# Patient Record
Sex: FEMALE | Race: WHITE | HISPANIC OR LATINO | Employment: FULL TIME | URBAN - METROPOLITAN AREA
[De-identification: names, ages, dates, MRNs, and addresses within clinical notes are randomized per-mention and may not be internally consistent; named-entity substitution may affect disease eponyms.]

---
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
## 2022-04-10 ENCOUNTER — HOSPITAL ENCOUNTER (EMERGENCY)
Facility: HOSPITAL | Age: 22
Discharge: HOME | End: 2022-04-10
Attending: EMERGENCY MEDICINE | Admitting: EMERGENCY MEDICINE
Payer: COMMERCIAL

## 2022-04-10 VITALS
DIASTOLIC BLOOD PRESSURE: 66 MMHG | OXYGEN SATURATION: 99 % | SYSTOLIC BLOOD PRESSURE: 115 MMHG | RESPIRATION RATE: 18 BRPM | HEART RATE: 94 BPM

## 2022-04-10 DIAGNOSIS — R10.9 ABDOMINAL PAIN: Primary | ICD-10-CM

## 2022-04-10 DIAGNOSIS — K29.70 GASTRITIS: ICD-10-CM

## 2022-04-10 LAB
ALBUMIN SERPL BCP-MCNC: 4 G/DL (ref 3.5–5)
ALP SERPL-CCNC: 65 U/L (ref 46–116)
ALT SERPL W P-5'-P-CCNC: 20 U/L (ref 12–78)
ANION GAP SERPL CALCULATED.3IONS-SCNC: 5 MMOL/L (ref 4–13)
AST SERPL W P-5'-P-CCNC: 14 U/L (ref 5–45)
BASOPHILS # BLD AUTO: 0.04 THOUSANDS/ΜL (ref 0–0.1)
BASOPHILS NFR BLD AUTO: 1 % (ref 0–1)
BILIRUB SERPL-MCNC: 0.45 MG/DL (ref 0.2–1)
BILIRUB UR QL STRIP: NEGATIVE
BUN SERPL-MCNC: 13 MG/DL (ref 5–25)
CALCIUM SERPL-MCNC: 9.1 MG/DL (ref 8.3–10.1)
CHLORIDE SERPL-SCNC: 107 MMOL/L (ref 100–108)
CLARITY UR: CLEAR
CO2 SERPL-SCNC: 25 MMOL/L (ref 21–32)
COLOR UR: YELLOW
CREAT SERPL-MCNC: 0.82 MG/DL (ref 0.6–1.3)
EOSINOPHIL # BLD AUTO: 0.04 THOUSAND/ΜL (ref 0–0.61)
EOSINOPHIL NFR BLD AUTO: 1 % (ref 0–6)
ERYTHROCYTE [DISTWIDTH] IN BLOOD BY AUTOMATED COUNT: 12.6 % (ref 11.6–15.1)
EXT PREG TEST URINE: NEGATIVE
EXT. CONTROL ED NAV: NORMAL
GFR SERPL CREATININE-BSD FRML MDRD: 102 ML/MIN/1.73SQ M
GLUCOSE SERPL-MCNC: 117 MG/DL (ref 65–140)
GLUCOSE UR STRIP-MCNC: NEGATIVE MG/DL
HCT VFR BLD AUTO: 41.5 % (ref 34.8–46.1)
HGB BLD-MCNC: 13.6 G/DL (ref 11.5–15.4)
HGB UR QL STRIP.AUTO: NEGATIVE
IMM GRANULOCYTES # BLD AUTO: 0.01 THOUSAND/UL (ref 0–0.2)
IMM GRANULOCYTES NFR BLD AUTO: 0 % (ref 0–2)
KETONES UR STRIP-MCNC: ABNORMAL MG/DL
LEUKOCYTE ESTERASE UR QL STRIP: NEGATIVE
LIPASE SERPL-CCNC: 95 U/L (ref 73–393)
LYMPHOCYTES # BLD AUTO: 2.99 THOUSANDS/ΜL (ref 0.6–4.47)
LYMPHOCYTES NFR BLD AUTO: 43 % (ref 14–44)
MCH RBC QN AUTO: 29.8 PG (ref 26.8–34.3)
MCHC RBC AUTO-ENTMCNC: 32.8 G/DL (ref 31.4–37.4)
MCV RBC AUTO: 91 FL (ref 82–98)
MONOCYTES # BLD AUTO: 0.69 THOUSAND/ΜL (ref 0.17–1.22)
MONOCYTES NFR BLD AUTO: 10 % (ref 4–12)
NEUTROPHILS # BLD AUTO: 3.2 THOUSANDS/ΜL (ref 1.85–7.62)
NEUTS SEG NFR BLD AUTO: 45 % (ref 43–75)
NITRITE UR QL STRIP: NEGATIVE
NRBC BLD AUTO-RTO: 0 /100 WBCS
PH UR STRIP.AUTO: 5.5 [PH] (ref 4.5–8)
PLATELET # BLD AUTO: 271 THOUSANDS/UL (ref 149–390)
PMV BLD AUTO: 9.6 FL (ref 8.9–12.7)
POTASSIUM SERPL-SCNC: 3.2 MMOL/L (ref 3.5–5.3)
PROT SERPL-MCNC: 8 G/DL (ref 6.4–8.2)
PROT UR STRIP-MCNC: NEGATIVE MG/DL
RBC # BLD AUTO: 4.56 MILLION/UL (ref 3.81–5.12)
SODIUM SERPL-SCNC: 137 MMOL/L (ref 136–145)
SP GR UR STRIP.AUTO: >=1.03 (ref 1–1.03)
UROBILINOGEN UR QL STRIP.AUTO: 0.2 E.U./DL
WBC # BLD AUTO: 6.97 THOUSAND/UL (ref 4.31–10.16)

## 2022-04-10 PROCEDURE — 99284 EMERGENCY DEPT VISIT MOD MDM: CPT

## 2022-04-10 PROCEDURE — 85025 COMPLETE CBC W/AUTO DIFF WBC: CPT

## 2022-04-10 PROCEDURE — 80053 COMPREHEN METABOLIC PANEL: CPT

## 2022-04-10 PROCEDURE — 83690 ASSAY OF LIPASE: CPT

## 2022-04-10 PROCEDURE — 81003 URINALYSIS AUTO W/O SCOPE: CPT

## 2022-04-10 PROCEDURE — 36415 COLL VENOUS BLD VENIPUNCTURE: CPT

## 2022-04-10 PROCEDURE — 99284 EMERGENCY DEPT VISIT MOD MDM: CPT | Performed by: EMERGENCY MEDICINE

## 2022-04-10 PROCEDURE — 81025 URINE PREGNANCY TEST: CPT

## 2022-04-10 RX ORDER — FAMOTIDINE 20 MG/1
20 TABLET, FILM COATED ORAL 2 TIMES DAILY
Qty: 30 TABLET | Refills: 0 | Status: SHIPPED | OUTPATIENT
Start: 2022-04-10

## 2022-04-10 NOTE — ED PROVIDER NOTES
History  Chief Complaint   Patient presents with    Abdominal Pain     pt c/o abd pain, sharp pain on inhalation eopigastric area, no n/v/d     20-year-old female patient with no medical problems presenting with abdominal pain onset 2 hours prior to arrival   Patient states that she was resting when she suddenly had abdominal pain  States pain sharp in worsens when she inspires  Patient states nothing seems to improve or worsen her pain however pain has improved since initial onset  Denies chest pain, shortness of breath, abdominal pain, nausea, vomiting, diarrhea, urinary symptoms, vaginal bleeding/discharge but admits to chronic right-sided arm pain  Patient denies being pregnant, LMP was 2 weeks ago  None       No past medical history on file  No past surgical history on file  No family history on file  I have reviewed and agree with the history as documented  E-Cigarette/Vaping     E-Cigarette/Vaping Substances     Social History     Tobacco Use    Smoking status: Never Smoker    Smokeless tobacco: Not on file   Substance Use Topics    Alcohol use: No    Drug use: No        Review of Systems   Constitutional: Negative for chills, fatigue and fever  HENT: Negative for congestion, rhinorrhea and sore throat  Respiratory: Negative for cough, chest tightness and shortness of breath  Cardiovascular: Negative for chest pain and leg swelling  Gastrointestinal: Positive for abdominal pain  Negative for abdominal distention, diarrhea, nausea and vomiting  Genitourinary: Negative for difficulty urinating and dysuria  Musculoskeletal: Negative for arthralgias, back pain and myalgias  Right arm pain   Skin: Negative for rash and wound  Neurological: Negative for dizziness, weakness and headaches  All other systems reviewed and are negative        Physical Exam  ED Triage Vitals [04/10/22 0134]   Temp Pulse Respirations Blood Pressure SpO2   -- 94 18 115/66 99 %      Temp src Heart Rate Source Patient Position - Orthostatic VS BP Location FiO2 (%)   -- Monitor Lying Right arm --      Pain Score       8             Orthostatic Vital Signs  Vitals:    04/10/22 0134   BP: 115/66   Pulse: 94   Patient Position - Orthostatic VS: Lying       Physical Exam  Vitals reviewed  Constitutional:       Appearance: Normal appearance  HENT:      Head: Normocephalic and atraumatic  Nose: Nose normal       Mouth/Throat:      Mouth: Mucous membranes are moist       Pharynx: Oropharynx is clear  Eyes:      Extraocular Movements: Extraocular movements intact  Conjunctiva/sclera: Conjunctivae normal    Cardiovascular:      Rate and Rhythm: Normal rate and regular rhythm  Pulses: Normal pulses  Heart sounds: Normal heart sounds  Pulmonary:      Effort: Pulmonary effort is normal       Breath sounds: Normal breath sounds  Abdominal:      General: Bowel sounds are normal       Palpations: Abdomen is soft  Tenderness: There is abdominal tenderness (Epigastric tenderness) in the right upper quadrant and epigastric area  There is no right CVA tenderness or left CVA tenderness  Musculoskeletal:         General: Normal range of motion  Cervical back: Normal range of motion  Skin:     General: Skin is warm and dry  Neurological:      General: No focal deficit present  Mental Status: She is alert and oriented to person, place, and time  Mental status is at baseline           ED Medications  Medications - No data to display    Diagnostic Studies  Results Reviewed     Procedure Component Value Units Date/Time    POCT pregnancy, urine [[id number]]  (Normal) Resulted: 04/10/22 0323    Lab Status: Final result Updated: 04/10/22 0323     EXT PREG TEST UR (Ref: Negative) negative     Control valid    Urine Macroscopic, POC [[id number]]  (Abnormal) Collected: 04/10/22 0320    Lab Status: Final result Specimen: Urine Updated: 04/10/22 0321     Color, UA Yellow     Clarity, UA Clear     pH, UA 5 5     Leukocytes, UA Negative     Nitrite, UA Negative     Protein, UA Negative mg/dl      Glucose, UA Negative mg/dl      Ketones, UA 15 (1+) mg/dl      Urobilinogen, UA 0 2 E U /dl      Bilirubin, UA Negative     Blood, UA Negative     Specific Gravity, UA >=1 030    Narrative:      CLINITEK RESULT    Comprehensive metabolic panel [[id number]]  (Abnormal) Collected: 04/10/22 0152    Lab Status: Final result Specimen: Blood from Arm, Left Updated: 04/10/22 0236     Sodium 137 mmol/L      Potassium 3 2 mmol/L      Chloride 107 mmol/L      CO2 25 mmol/L      ANION GAP 5 mmol/L      BUN 13 mg/dL      Creatinine 0 82 mg/dL      Glucose 117 mg/dL      Calcium 9 1 mg/dL      AST 14 U/L      ALT 20 U/L      Alkaline Phosphatase 65 U/L      Total Protein 8 0 g/dL      Albumin 4 0 g/dL      Total Bilirubin 0 45 mg/dL      eGFR 102 ml/min/1 73sq m     Narrative:      [facility] guidelines for Chronic Kidney Disease (CKD):     Stage 1 with normal or high GFR (GFR > 90 mL/min/1 73 square meters)    Stage 2 Mild CKD (GFR = 60-89 mL/min/1 73 square meters)    Stage 3A Moderate CKD (GFR = 45-59 mL/min/1 73 square meters)    Stage 3B Moderate CKD (GFR = 30-44 mL/min/1 73 square meters)    Stage 4 Severe CKD (GFR = 15-29 mL/min/1 73 square meters)    Stage 5 End Stage CKD (GFR <15 mL/min/1 73 square meters)  Note: GFR calculation is accurate only with a steady state creatinine    Lipase [[id number]]  (Normal) Collected: 04/10/22 0152    Lab Status: Final result Specimen: Blood from Arm, Left Updated: 04/10/22 0236     Lipase 95 u/L     CBC and differential [[id number]] Collected: 04/10/22 0152    Lab Status: Final result Specimen: Blood from Arm, Left Updated: 04/10/22 0200     WBC 6 97 Thousand/uL      RBC 4 56 Million/uL      Hemoglobin 13 6 g/dL      Hematocrit 41 5 %      MCV 91 fL      MCH 29 8 pg      MCHC 32 8 g/dL      RDW 12 6 %      MPV 9 6 fL      Platelets 028 Thousands/uL nRBC 0 /100 WBCs      Neutrophils Relative 45 %      Immat GRANS % 0 %      Lymphocytes Relative 43 %      Monocytes Relative 10 %      Eosinophils Relative 1 %      Basophils Relative 1 %      Neutrophils Absolute 3 20 Thousands/µL      Immature Grans Absolute 0 01 Thousand/uL      Lymphocytes Absolute 2 99 Thousands/µL      Monocytes Absolute 0 69 Thousand/µL      Eosinophils Absolute 0 04 Thousand/µL      Basophils Absolute 0 04 Thousands/µL                  No orders to display         Procedures  Procedures      ED Course                             SBIRT 20yo+      Most Recent Value   SBIRT (22 yo +)    In order to provide better care to our patients, we are screening all of our patients for alcohol and drug use  Would it be okay to ask you these screening questions? No Filed at: 04/10/2022 0325                [facility]  Number of Diagnoses or Management Options  Abdominal pain  Gastritis  Diagnosis management comments: 24 y o  patient presenting with abd pain  No n/v/d  Exam shows epigastric tenderness  Labs WNL, normal lipase  On reexamination, patient asymptomatic and abd pain resolved  Stable for discharge with follow up with PCP with pepcid  Likely gastritis  Return precautions given  Amount and/or Complexity of Data Reviewed  Clinical lab tests: ordered and reviewed        Disposition  Final diagnoses:   Abdominal pain   Gastritis     Time reflects when diagnosis was documented in both MDM as applicable and the Disposition within this note     Time User Action Codes Description Comment    4/10/2022  3:54 AM Graeme Andrews [R10 9] Abdominal pain     4/10/2022  3:54 AM Graeme Andrews [K29 70] Gastritis       ED Disposition     ED Disposition Condition Date/Time Comment    Discharge Stable Sun Apr 10, 2022  3:54 AM Delta Mendieta discharge to home/self care              Follow-up Information    None         Discharge Medication List as of 4/10/2022  3:56 AM      START taking these medications Details   famotidine (PEPCID) 20 mg tablet Take 1 tablet (20 mg total) by mouth 2 (two) times a day, Starting Sun 4/10/2022, Normal           No discharge procedures on file  PDMP Review     None           ED Provider  Attending physically available and evaluated Delta Mendieta I managed the patient along with the ED Attending      Electronically Signed by         Ronit Magdaleno MD  04/10/22 3541

## 2022-04-10 NOTE — ED ATTENDING ATTESTATION
4/10/2022  Oziel RODRIGUES DO, saw and evaluated the patient  I have discussed the patient with the resident/non-physician practitioner and agree with the resident's/non-physician practitioner's findings, Plan of Care, and MDM as documented in the resident's/non-physician practitioner's note, except where noted  All available labs and Radiology studies were reviewed  I was present for key portions of any procedure(s) performed by the resident/non-physician practitioner and I was immediately available to provide assistance  At this point I agree with the current assessment done in the Emergency Department  I have conducted an independent evaluation of this patient a history and physical is as follows:    25-year-old female with abdominal pain  Epigastric  Hurts to move her breathe  Pain when eating at times  Sometimes at rest   No other modifying factors or associated symptoms  Last menstrual period two weeks ago  No vaginal bleeding no discharge  Has right arm pain that is chronic  Exam is tender in the epigastric area  No rebound  Negative Roman's negative McBurney's    Plan labs symptom control likely discharge    ED Course         Critical Care Time  Procedures

## 2022-07-07 ENCOUNTER — HOSPITAL ENCOUNTER (EMERGENCY)
Facility: HOSPITAL | Age: 22
Discharge: HOME | End: 2022-07-07
Attending: EMERGENCY MEDICINE
Payer: COMMERCIAL

## 2022-07-07 ENCOUNTER — APPOINTMENT (EMERGENCY)
Dept: CT IMAGING | Facility: HOSPITAL | Age: 22
End: 2022-07-07
Payer: COMMERCIAL

## 2022-07-07 ENCOUNTER — APPOINTMENT (EMERGENCY)
Dept: RADIOLOGY | Facility: HOSPITAL | Age: 22
End: 2022-07-07
Payer: COMMERCIAL

## 2022-07-07 VITALS
SYSTOLIC BLOOD PRESSURE: 106 MMHG | OXYGEN SATURATION: 100 % | DIASTOLIC BLOOD PRESSURE: 58 MMHG | RESPIRATION RATE: 16 BRPM | HEART RATE: 96 BPM | TEMPERATURE: 98.3 F

## 2022-07-07 DIAGNOSIS — T74.21XA SEXUAL ASSAULT OF ADULT, INITIAL ENCOUNTER: ICD-10-CM
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
## 2022-07-07 DIAGNOSIS — R11.0 NAUSEA: ICD-10-CM

## 2022-07-07 DIAGNOSIS — S02.2XXA NASAL FRACTURE: Primary | ICD-10-CM

## 2022-07-07 LAB
ABO GROUP BLD: NORMAL
ALBUMIN SERPL BCP-MCNC: 3.6 G/DL (ref 3.5–5)
ALP SERPL-CCNC: 58 U/L (ref 46–116)
ALT SERPL W P-5'-P-CCNC: 21 U/L (ref 12–78)
ANION GAP SERPL CALCULATED.3IONS-SCNC: 14 MMOL/L (ref 4–13)
AST SERPL W P-5'-P-CCNC: 17 U/L (ref 5–45)
B-HCG SERPL-ACNC: <2 MIU/ML
BASOPHILS # BLD AUTO: 0.04 THOUSANDS/ΜL (ref 0–0.1)
BASOPHILS NFR BLD AUTO: 0 % (ref 0–1)
BILIRUB SERPL-MCNC: 0.44 MG/DL (ref 0.2–1)
BILIRUB UR QL STRIP: NEGATIVE
BLD GP AB SCN SERPL QL: NEGATIVE
BUN SERPL-MCNC: 13 MG/DL (ref 5–25)
CALCIUM SERPL-MCNC: 9.3 MG/DL (ref 8.3–10.1)
CHLORIDE SERPL-SCNC: 104 MMOL/L (ref 100–108)
CLARITY UR: ABNORMAL
CO2 SERPL-SCNC: 20 MMOL/L (ref 21–32)
COLOR UR: YELLOW
CREAT SERPL-MCNC: 0.78 MG/DL (ref 0.6–1.3)
EOSINOPHIL # BLD AUTO: 0 THOUSAND/ΜL (ref 0–0.61)
EOSINOPHIL NFR BLD AUTO: 0 % (ref 0–6)
ERYTHROCYTE [DISTWIDTH] IN BLOOD BY AUTOMATED COUNT: 13.4 % (ref 11.6–15.1)
GFR SERPL CREATININE-BSD FRML MDRD: 109 ML/MIN/1.73SQ M
GLUCOSE SERPL-MCNC: 82 MG/DL (ref 65–140)
GLUCOSE UR STRIP-MCNC: NEGATIVE MG/DL
HCT VFR BLD AUTO: 41.2 % (ref 34.8–46.1)
HGB BLD-MCNC: 13.5 G/DL (ref 11.5–15.4)
HGB UR QL STRIP.AUTO: NEGATIVE
IMM GRANULOCYTES # BLD AUTO: 0.07 THOUSAND/UL (ref 0–0.2)
IMM GRANULOCYTES NFR BLD AUTO: 0 % (ref 0–2)
KETONES UR STRIP-MCNC: ABNORMAL MG/DL
LEUKOCYTE ESTERASE UR QL STRIP: NEGATIVE
LYMPHOCYTES # BLD AUTO: 2.26 THOUSANDS/ΜL (ref 0.6–4.47)
LYMPHOCYTES NFR BLD AUTO: 13 % (ref 14–44)
MCH RBC QN AUTO: 29.5 PG (ref 26.8–34.3)
MCHC RBC AUTO-ENTMCNC: 32.8 G/DL (ref 31.4–37.4)
MCV RBC AUTO: 90 FL (ref 82–98)
MONOCYTES # BLD AUTO: 0.9 THOUSAND/ΜL (ref 0.17–1.22)
MONOCYTES NFR BLD AUTO: 5 % (ref 4–12)
NEUTROPHILS # BLD AUTO: 13.73 THOUSANDS/ΜL (ref 1.85–7.62)
NEUTS SEG NFR BLD AUTO: 82 % (ref 43–75)
NITRITE UR QL STRIP: NEGATIVE
NRBC BLD AUTO-RTO: 0 /100 WBCS
PH UR STRIP.AUTO: 5.5 [PH] (ref 4.5–8)
PLATELET # BLD AUTO: 242 THOUSANDS/UL (ref 149–390)
PMV BLD AUTO: 9.7 FL (ref 8.9–12.7)
POTASSIUM SERPL-SCNC: 3 MMOL/L (ref 3.5–5.3)
PROT SERPL-MCNC: 7.5 G/DL (ref 6.4–8.2)
PROT UR STRIP-MCNC: NEGATIVE MG/DL
RBC # BLD AUTO: 4.58 MILLION/UL (ref 3.81–5.12)
RH BLD: POSITIVE
SODIUM SERPL-SCNC: 138 MMOL/L (ref 136–145)
SP GR UR STRIP.AUTO: 1.02 (ref 1–1.03)
SPECIMEN EXPIRATION DATE: NORMAL
UROBILINOGEN UR QL STRIP.AUTO: 0.2 E.U./DL
WBC # BLD AUTO: 17 THOUSAND/UL (ref 4.31–10.16)

## 2022-07-07 PROCEDURE — G1004 CDSM NDSC: HCPCS

## 2022-07-07 PROCEDURE — 96372 THER/PROPH/DIAG INJ SC/IM: CPT

## 2022-07-07 PROCEDURE — 86901 BLOOD TYPING SEROLOGIC RH(D): CPT | Performed by: PHYSICIAN ASSISTANT

## 2022-07-07 PROCEDURE — 70450 CT HEAD/BRAIN W/O DYE: CPT

## 2022-07-07 PROCEDURE — NC001 PR NO CHARGE: Performed by: PHYSICIAN ASSISTANT

## 2022-07-07 PROCEDURE — 86900 BLOOD TYPING SEROLOGIC ABO: CPT | Performed by: PHYSICIAN ASSISTANT

## 2022-07-07 PROCEDURE — 73564 X-RAY EXAM KNEE 4 OR MORE: CPT

## 2022-07-07 PROCEDURE — 73552 X-RAY EXAM OF FEMUR 2/>: CPT

## 2022-07-07 PROCEDURE — 84702 CHORIONIC GONADOTROPIN TEST: CPT | Performed by: PHYSICIAN ASSISTANT

## 2022-07-07 PROCEDURE — 87491 CHLMYD TRACH DNA AMP PROBE: CPT | Performed by: PHYSICIAN ASSISTANT

## 2022-07-07 PROCEDURE — 71250 CT THORAX DX C-: CPT

## 2022-07-07 PROCEDURE — 96361 HYDRATE IV INFUSION ADD-ON: CPT

## 2022-07-07 PROCEDURE — 96365 THER/PROPH/DIAG IV INF INIT: CPT

## 2022-07-07 PROCEDURE — 99285 EMERGENCY DEPT VISIT HI MDM: CPT

## 2022-07-07 PROCEDURE — 70486 CT MAXILLOFACIAL W/O DYE: CPT

## 2022-07-07 PROCEDURE — 87591 N.GONORRHOEAE DNA AMP PROB: CPT | Performed by: PHYSICIAN ASSISTANT

## 2022-07-07 PROCEDURE — 96366 THER/PROPH/DIAG IV INF ADDON: CPT

## 2022-07-07 PROCEDURE — 87389 HIV-1 AG W/HIV-1&-2 AB AG IA: CPT | Performed by: PHYSICIAN ASSISTANT

## 2022-07-07 PROCEDURE — 90715 TDAP VACCINE 7 YRS/> IM: CPT | Performed by: PHYSICIAN ASSISTANT

## 2022-07-07 PROCEDURE — 86803 HEPATITIS C AB TEST: CPT | Performed by: PHYSICIAN ASSISTANT

## 2022-07-07 PROCEDURE — 80053 COMPREHEN METABOLIC PANEL: CPT | Performed by: PHYSICIAN ASSISTANT

## 2022-07-07 PROCEDURE — 90471 IMMUNIZATION ADMIN: CPT

## 2022-07-07 PROCEDURE — 73030 X-RAY EXAM OF SHOULDER: CPT

## 2022-07-07 PROCEDURE — 86850 RBC ANTIBODY SCREEN: CPT | Performed by: PHYSICIAN ASSISTANT

## 2022-07-07 PROCEDURE — 36415 COLL VENOUS BLD VENIPUNCTURE: CPT | Performed by: PHYSICIAN ASSISTANT

## 2022-07-07 PROCEDURE — 87340 HEPATITIS B SURFACE AG IA: CPT | Performed by: PHYSICIAN ASSISTANT

## 2022-07-07 PROCEDURE — 96375 TX/PRO/DX INJ NEW DRUG ADDON: CPT

## 2022-07-07 PROCEDURE — 86706 HEP B SURFACE ANTIBODY: CPT | Performed by: PHYSICIAN ASSISTANT

## 2022-07-07 PROCEDURE — 72125 CT NECK SPINE W/O DYE: CPT

## 2022-07-07 PROCEDURE — 96376 TX/PRO/DX INJ SAME DRUG ADON: CPT

## 2022-07-07 PROCEDURE — 74176 CT ABD & PELVIS W/O CONTRAST: CPT

## 2022-07-07 PROCEDURE — 99285 EMERGENCY DEPT VISIT HI MDM: CPT | Performed by: PHYSICIAN ASSISTANT

## 2022-07-07 PROCEDURE — 86592 SYPHILIS TEST NON-TREP QUAL: CPT | Performed by: PHYSICIAN ASSISTANT

## 2022-07-07 PROCEDURE — 85025 COMPLETE CBC W/AUTO DIFF WBC: CPT | Performed by: PHYSICIAN ASSISTANT

## 2022-07-07 PROCEDURE — 81003 URINALYSIS AUTO W/O SCOPE: CPT

## 2022-07-07 RX ORDER — POTASSIUM CHLORIDE 14.9 MG/ML
20 INJECTION INTRAVENOUS ONCE
Status: COMPLETED | OUTPATIENT
Start: 2022-07-07 | End: 2022-07-07

## 2022-07-07 RX ORDER — EMTRICITABINE AND TENOFOVIR DISOPROXIL FUMARATE 200; 300 MG/1; MG/1
1 TABLET, FILM COATED ORAL DAILY
Qty: 28 TABLET | Refills: 0 | Status: SHIPPED | OUTPATIENT
Start: 2022-07-07 | End: 2022-07-07

## 2022-07-07 RX ORDER — MORPHINE SULFATE 4 MG/ML
4 INJECTION, SOLUTION INTRAMUSCULAR; INTRAVENOUS ONCE
Status: COMPLETED | OUTPATIENT
Start: 2022-07-07 | End: 2022-07-07

## 2022-07-07 RX ORDER — ONDANSETRON 4 MG/1
4 TABLET, ORALLY DISINTEGRATING ORAL ONCE
Status: COMPLETED | OUTPATIENT
Start: 2022-07-07 | End: 2022-07-07

## 2022-07-07 RX ORDER — LEVONORGESTREL 1.5 MG/1
1.5 TABLET ORAL ONCE
Status: COMPLETED | OUTPATIENT
Start: 2022-07-07 | End: 2022-07-07

## 2022-07-07 RX ORDER — SODIUM CHLORIDE 9 MG/ML
125 INJECTION, SOLUTION INTRAVENOUS CONTINUOUS
Status: DISCONTINUED | OUTPATIENT
Start: 2022-07-07 | End: 2022-07-07 | Stop reason: HOSPADM

## 2022-07-07 RX ORDER — CEFTRIAXONE 1 G/1
1 INJECTION, POWDER, FOR SOLUTION INTRAMUSCULAR; INTRAVENOUS ONCE
Status: DISCONTINUED | OUTPATIENT
Start: 2022-07-07 | End: 2022-07-07 | Stop reason: SDUPTHER

## 2022-07-07 RX ORDER — METRONIDAZOLE 500 MG/1
500 TABLET ORAL EVERY 8 HOURS SCHEDULED
Qty: 21 TABLET | Refills: 0 | Status: SHIPPED | OUTPATIENT
Start: 2022-07-07 | End: 2022-07-14

## 2022-07-07 RX ORDER — ONDANSETRON 2 MG/ML
4 INJECTION INTRAMUSCULAR; INTRAVENOUS ONCE
Status: COMPLETED | OUTPATIENT
Start: 2022-07-07 | End: 2022-07-07

## 2022-07-07 RX ORDER — DOXYCYCLINE HYCLATE 100 MG/1
100 CAPSULE ORAL ONCE
Status: COMPLETED | OUTPATIENT
Start: 2022-07-07 | End: 2022-07-07

## 2022-07-07 RX ORDER — POTASSIUM CHLORIDE 20 MEQ/1
40 TABLET, EXTENDED RELEASE ORAL ONCE
Status: COMPLETED | OUTPATIENT
Start: 2022-07-07 | End: 2022-07-07

## 2022-07-07 RX ORDER — ONDANSETRON 4 MG/1
4 TABLET, ORALLY DISINTEGRATING ORAL EVERY 6 HOURS PRN
Qty: 20 TABLET | Refills: 0 | Status: SHIPPED | OUTPATIENT
Start: 2022-07-07 | End: 2022-08-25

## 2022-07-07 RX ORDER — METRONIDAZOLE 500 MG/1
500 TABLET ORAL ONCE
Status: COMPLETED | OUTPATIENT
Start: 2022-07-07 | End: 2022-07-07

## 2022-07-07 RX ORDER — NAPROXEN 500 MG/1
500 TABLET ORAL 2 TIMES DAILY PRN
Qty: 30 TABLET | Refills: 0 | Status: SHIPPED | OUTPATIENT
Start: 2022-07-07 | End: 2022-08-25

## 2022-07-07 RX ADMIN — Medication 1 BOTTLE: at 16:47

## 2022-07-07 RX ADMIN — SODIUM CHLORIDE 1000 ML: 0.9 INJECTION, SOLUTION INTRAVENOUS at 07:33

## 2022-07-07 RX ADMIN — POTASSIUM CHLORIDE 20 MEQ: 14.9 INJECTION, SOLUTION INTRAVENOUS at 09:39

## 2022-07-07 RX ADMIN — CEFTRIAXONE SODIUM 1000 MG: 1 INJECTION, POWDER, FOR SOLUTION INTRAMUSCULAR; INTRAVENOUS at 16:49

## 2022-07-07 RX ADMIN — TETANUS TOXOID, REDUCED DIPHTHERIA TOXOID AND ACELLULAR PERTUSSIS VACCINE, ADSORBED 0.5 ML: 5; 2.5; 8; 8; 2.5 SUSPENSION INTRAMUSCULAR at 09:31

## 2022-07-07 RX ADMIN — LEVONORGESTREL 1.5 MG: 1.5 TABLET ORAL at 16:48

## 2022-07-07 RX ADMIN — METRONIDAZOLE 500 MG: 500 TABLET ORAL at 16:48

## 2022-07-07 RX ADMIN — POTASSIUM CHLORIDE 40 MEQ: 1500 TABLET, EXTENDED RELEASE ORAL at 09:39

## 2022-07-07 RX ADMIN — ONDANSETRON 4 MG: 2 INJECTION INTRAMUSCULAR; INTRAVENOUS at 07:34

## 2022-07-07 RX ADMIN — MORPHINE SULFATE 4 MG: 4 INJECTION INTRAVENOUS at 15:43

## 2022-07-07 RX ADMIN — MORPHINE SULFATE 2 MG: 2 INJECTION, SOLUTION INTRAMUSCULAR; INTRAVENOUS at 07:34

## 2022-07-07 RX ADMIN — ONDANSETRON 4 MG: 4 TABLET, ORALLY DISINTEGRATING ORAL at 17:10

## 2022-07-07 RX ADMIN — MORPHINE SULFATE 4 MG: 4 INJECTION INTRAVENOUS at 09:30

## 2022-07-07 RX ADMIN — DOXYCYCLINE 100 MG: 100 CAPSULE ORAL at 16:46

## 2022-07-07 NOTE — DISCHARGE INSTRUCTIONS
Results for orders placed or performed during the hospital encounter of 07/07/22   CBC and differential   Result Value Ref Range    WBC 17 00 (H) 4 31 - 10 16 Thousand/uL    RBC 4 58 3 81 - 5 12 Million/uL    Hemoglobin 13 5 11 5 - 15 4 g/dL    Hematocrit 41 2 34 8 - 46 1 %    MCV 90 82 - 98 fL    MCH 29 5 26 8 - 34 3 pg    MCHC 32 8 31 4 - 37 4 g/dL    RDW 13 4 11 6 - 15 1 %    MPV 9 7 8 9 - 12 7 fL    Platelets 772 785 - 111 Thousands/uL    nRBC 0 /100 WBCs    Neutrophils Relative 82 (H) 43 - 75 %    Immat GRANS % 0 0 - 2 %    Lymphocytes Relative 13 (L) 14 - 44 %    Monocytes Relative 5 4 - 12 %    Eosinophils Relative 0 0 - 6 %    Basophils Relative 0 0 - 1 %    Neutrophils Absolute 13 73 (H) 1 85 - 7 62 Thousands/µL    Immature Grans Absolute 0 07 0 00 - 0 20 Thousand/uL    Lymphocytes Absolute 2 26 0 60 - 4 47 Thousands/µL    Monocytes Absolute 0 90 0 17 - 1 22 Thousand/µL    Eosinophils Absolute 0 00 0 00 - 0 61 Thousand/µL    Basophils Absolute 0 04 0 00 - 0 10 Thousands/µL   Comprehensive metabolic panel   Result Value Ref Range    Sodium 138 136 - 145 mmol/L    Potassium 3 0 (L) 3 5 - 5 3 mmol/L    Chloride 104 100 - 108 mmol/L    CO2 20 (L) 21 - 32 mmol/L    ANION GAP 14 (H) 4 - 13 mmol/L    BUN 13 5 - 25 mg/dL    Creatinine 0 78 0 60 - 1 30 mg/dL    Glucose 82 65 - 140 mg/dL    Calcium 9 3 8 3 - 10 1 mg/dL    AST 17 5 - 45 U/L    ALT 21 12 - 78 U/L    Alkaline Phosphatase 58 46 - 116 U/L    Total Protein 7 5 6 4 - 8 2 g/dL    Albumin 3 6 3 5 - 5 0 g/dL    Total Bilirubin 0 44 0 20 - 1 00 mg/dL    eGFR 109 ml/min/1 73sq m   Quantitative hCG   Result Value Ref Range    HCG, Quant <2 <=6 mIU/mL   Type and screen   Result Value Ref Range    ABO Grouping A     Rh Factor Positive     Antibody Screen Negative     Specimen Expiration Date [id number]      TRAUMA - CT head wo contrast   Final Result      No acute intracranial hemorrhage or mass effect  Soft tissue swelling as above                    Workstation performed: [id number]         TRAUMA - CT spine cervical wo contrast   Final Result      No cervical spine fracture or traumatic malalignment  Workstation performed: [id number]         TRAUMA - CT facial bones wo contrast   Final Result      Minimally displaced fracture through the anterior nasal bone  Left periorbital soft tissue swelling  Workstation performed: [id number]         CT chest abdomen pelvis wo contrast   Final Result      No definite acute traumatic CT findings  Chronic appearing bilateral pars fractures of L5 with grade 1 anterolisthesis of L5 over S1  Fracture deformity of the anterior aspect of S1 also likely chronic  Workstation performed: [id number]         CT recon only thoracolumbar (no charge)   Final Result      More chronic appearing bilateral pars fractures of L5 with grade 1 anterolisthesis of L5 over S1 as described above  There is also deformity and bony defect of the anterior aspect of S1 also likely more chronic  Degenerative disc disease L5-S1                 Workstation performed: [id number]         XR femur 2 views RIGHT    (Results Pending)   XR knee 4+ vw right injury    (Results Pending)   XR knee 4+ vw left injury    (Results Pending)
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
## 2022-07-07 NOTE — ED NOTES
Patient again verbalized thoughts of suicide to RAMON ONEAL, ED provider notified, crisis evaluation to be completed once exam is completed       Ken Samuel RN  07/07/22 0073

## 2022-07-07 NOTE — ED NOTES
Officer nAalia Johnson from [address] [place] [address] at patient bedside        Sarah Giraldo RN  07/07/22 8670

## 2022-07-07 NOTE — ED NOTES
[employer] officers #[id number] and [address][id number] at [address] [facility] [address], [address] [place] [address]  07/07/22 0723

## 2022-07-07 NOTE — ED NOTES
Patient : Yanna Urrutia Age: 30 year old Sex: female   MRN: [id number] Encounter Date: 5/12/2022    E16/16    History     Chief Complaint   Patient presents with   • Shortness of Breath   • Cough     HPI  5/12/2022  10:34 AM Yanna Urrutia is a 30 year old female who presents to the ED via private vehicle for evaluation of SOB and a cough that began one week ago. The pt reports she has had a congested cough, nasal congestion, and pain when coughing. She notes she has a HA when she coughs frequently. Pt denies n/v/d, BLE swelling, fever, or any other associated sx. She has been seen at [facility] twice for similar sx and had a normal CXR two days ago. She was tested for COVID-19 yesterday, but has not received the results yet. She has been vaccinated against COVID-19. 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 There are no further complaints or modifying factors at this time.    Chart Review: I reviewed the patient's medications, allergies, and past medical and surgical history in [facility]. Pt was seen at [facility] on 5/10/22 for cough and upper respiratory sx, and had a normal CXR. Diagnosed with viral URI.     PCP: Mary Bach MD      Current Discharge Medication List      Prior to Admission Medications    Details   metroNIDAZOLE (Flagyl) 500 MG tablet Take 1 tablet by mouth 2 times daily.  Qty: 14 tablet, Refills: 0      traMADol (ULTRAM) 50 MG tablet Take 1 tablet by mouth every 6 hours as needed for Pain.  Qty: 8 tablet, Refills: 0      lidocaine (LIDODERM) 5 % patch Place 1 patch onto the skin every 24 hours. Remove patch 12 hours after applying  Qty: 30 patch, Refills: 0           History reviewed. No pertinent past medical history.    History reviewed. No pertinent past surgical history.    History reviewed. No pertinent family history.    Social History     Tobacco Use   • Smoking status: Never Smoker   • Smokeless tobacco: Never Used   Vaping Use   • Vaping Use:  RAMON ONEAL at bedside     Analy Tamayo RN  07/07/22 4877 never used   Substance Use Topics   • Alcohol use: Not Currently   • Drug use: Yes     Types: Marijuana     Comment: yesterday       E-cigarette/Vaping   • E-Cigarette/Vaping Use Never Used      E-Cigarette/Vaping Substances & Devices       Review of Systems   Constitutional: Negative for appetite change, chills and fever.   HENT: Positive for congestion (nasal). Negative for nosebleeds and sore throat.    Eyes: Negative for visual disturbance.   Respiratory: Positive for cough (congested) and shortness of breath.    Cardiovascular: Negative for chest pain, palpitations and leg swelling.   Gastrointestinal: Negative for abdominal pain, blood in stool, constipation, diarrhea, nausea and vomiting.   Genitourinary: Negative for difficulty urinating, dysuria, frequency, hematuria and vaginal bleeding.   Musculoskeletal: Negative for back pain.   Skin: Negative for rash.   Neurological: Positive for headaches (when coughing). Negative for dizziness and weakness.   Hematological: Does not bruise/bleed easily.   Psychiatric/Behavioral: Negative for sleep disturbance.       Physical Exam     ED Triage Vitals [05/12/22 1016]   ED Triage Vitals Group      Temp 98.6 °F (37 °C)      Heart Rate (!) 104      Resp 20      /86      SpO2 98 %      EtCO2 mmHg       Height 5' 1\" (1.549 m)      Weight 232 lb (105.2 kg)      Weight Scale Used Standing scale      BMI (Calculated) 43.84      IBW/kg (Calculated) 47.8       Physical Exam  Vitals and nursing note reviewed.   Constitutional:       General: She is not in acute distress.  HENT:      Nose: Nose normal.   Eyes:      General: No scleral icterus.     Conjunctiva/sclera: Conjunctivae normal.      Pupils: Pupils are equal, round, and reactive to light.   Cardiovascular:      Rate and Rhythm: Normal rate and regular rhythm.      Heart sounds: Normal heart sounds. No murmur heard.  Pulmonary:      Effort: Pulmonary effort is normal. No respiratory distress.      Breath sounds:  Wheezing (expiratory) present. No rales.      Comments: Congested cough.   Abdominal:      General: Bowel sounds are normal. There is no distension.      Palpations: Abdomen is soft. There is no mass.      Tenderness: There is no abdominal tenderness.   Musculoskeletal:         General: Normal range of motion.      Cervical back: Normal range of motion and neck supple.   Lymphadenopathy:      Cervical: No cervical adenopathy.   Skin:     General: Skin is warm and dry.      Findings: No rash.   Neurological:      Mental Status: She is alert.      Cranial Nerves: No cranial nerve deficit.      Coordination: Coordination normal.   Psychiatric:         Behavior: Behavior normal.         ED Course     Procedures    Lab Results     No results found for this visit on 05/12/22.    EKG Results       Radiology Results     Imaging Results    None         ED Medication Orders (From admission, onward)    Ordered Start     Status Ordering Provider    05/12/22 1046 05/12/22 1047  albuterol inhaler 2 puff  ONCE         Last MAR action: Given JAMES NAIK    05/12/22 1046 05/12/22 1047  predniSONE (DELTASONE) tablet 60 mg  ONCE         Last MAR action: Given JAMES NAIK          ED Course as of 05/12/22 1650   Thu May 12, 2022   1233 Patient feels better after the albuterol inhaler.  I reexamined her lungs she still has a mild wheeze but it has improved.  I explained to her that I believe she has a viral bronchitis.  With a normal chest x-ray 2 days ago no and no fevers I have low concern for bacterial. With prominent cough and wheezing I have low concern for PE or cardiac etiology. I will treat her with a viral bronchitis treatment course of prednisone and inhalers. [SP]      ED Course User Index  [SP] DO BREANNA Weiss  Vitals  Vitals:    05/12/22 1209 05/12/22 1210 05/12/22 1211 05/12/22 1219   BP:       BP Location:       Patient Position:       Pulse:       Resp:       Temp:       TempSrc:        SpO2: 94% 96% 98% 98%   Weight:       Height:           ED Course  Initial Impression: Yanna Urrutia is a 30 year old female who presents to the ED today with c/o SOB, congested cough, nasal congestion, and pain with coughing that began one week ago. Pt had negative CXR at [facility] recently. Plan to order Prednisone and Albuterol.     MDM  Critical Care    No Critical Care     Diagnosis  The encounter diagnosis was Bronchitis.    Follow Up:  Mary Bach MD  [address] [facility] [id number] [phone]    In 2 days  As needed, If symptoms worsen       Discharge Medication List as of 5/12/2022 12:42 PM      START taking these medications    Details   predniSONE (DELTASONE) 20 MG tablet Take 3 tablets by mouth daily.Eprescribe, Disp-15 tablet, R-0             Pt is discharged in good condition.    I have reviewed the information recorded by the scribe for accuracy and agree with its contents.          I have reviewed the information recorded by the scribe for accuracy and agree with its contents.    ____________________________________________________________________    Julieta Wei acting as a scribe for Dr. Cathy Hernandez.    Dr. Cathy Hernandez  Dictation # [id number]  Scribe: Julieta Hernandez, DO  05/12/22 3904

## 2022-07-07 NOTE — PROGRESS NOTES
Patient referredby assigned RN  Patient may need assistance with discharge medications and needs follow up care  Met patient at bedside discuss follow up abbey chu was given the option to [facility] the [facility] Clin[facility]  [facility]  Patient decided to seek care at [facility]   This worker contact Steven [phone] consult case with RN Michael who will follow up with patient , AVS /DC to be fax mehrdad mentioned agency at   Attending and assigned RN informed  SW/CM to follow
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
## 2022-07-07 NOTE — ED NOTES
Patient denies current, and history of, suicidal ideation  Patient received resources for outpatient mental health to follow up as needed      Parish Mcdonald  Crisis Intervention Specialist II  07/07/22
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
## 2022-07-08 LAB
C TRACH DNA SPEC QL NAA+PROBE: NEGATIVE
HBV SURFACE AB SER-ACNC: 12.29 MIU/ML
HBV SURFACE AG SER QL: NORMAL
HCV AB SER QL: NORMAL
HIV 1+2 AB+HIV1 P24 AG SERPL QL IA: NORMAL
N GONORRHOEA DNA SPEC QL NAA+PROBE: NEGATIVE
RPR SER QL: NORMAL

## 2022-08-17 ENCOUNTER — HOSPITAL ENCOUNTER (EMERGENCY)
Facility: HOSPITAL | Age: 22
End: 2022-08-18
Attending: EMERGENCY MEDICINE
Payer: COMMERCIAL

## 2022-08-17 DIAGNOSIS — R45.851 SUICIDAL IDEATIONS: ICD-10-CM

## 2022-08-17 DIAGNOSIS — Z00.8 MEDICAL CLEARANCE FOR PSYCHIATRIC ADMISSION: ICD-10-CM

## 2022-08-17 DIAGNOSIS — F32.A DEPRESSION: Primary | ICD-10-CM

## 2022-08-17 LAB — ETHANOL EXG-MCNC: 0 MG/DL

## 2022-08-17 PROCEDURE — 99285 EMERGENCY DEPT VISIT HI MDM: CPT

## 2022-08-17 PROCEDURE — U0005 INFEC AGEN DETEC AMPLI PROBE: HCPCS | Performed by: EMERGENCY MEDICINE

## 2022-08-17 PROCEDURE — 99285 EMERGENCY DEPT VISIT HI MDM: CPT | Performed by: EMERGENCY MEDICINE

## 2022-08-17 PROCEDURE — U0003 INFECTIOUS AGENT DETECTION BY NUCLEIC ACID (DNA OR RNA); SEVERE ACUTE RESPIRATORY SYNDROME CORONAVIRUS 2 (SARS-COV-2) (CORONAVIRUS DISEASE [COVID-19]), AMPLIFIED PROBE TECHNIQUE, MAKING USE OF HIGH THROUGHPUT TECHNOLOGIES AS DESCRIBED BY CMS-2020-01-R: HCPCS | Performed by: EMERGENCY MEDICINE

## 2022-08-17 PROCEDURE — 82075 ASSAY OF BREATH ETHANOL: CPT | Performed by: EMERGENCY MEDICINE

## 2022-08-17 PROCEDURE — 81025 URINE PREGNANCY TEST: CPT | Performed by: EMERGENCY MEDICINE

## 2022-08-17 NOTE — LETTER
[facility] EMERGENCY DEPARTMENT  [facility] [address]  [phone]  Dept: [phone]      EMTALA TRANSFER CONSENT    NAME Dionisio Baer                                        [birth year]LLI 2000                              MRN [id number]    I have been informed of my rights regarding examination, treatment, and transfer   by Dr Natalie Catherine, *    Benefits:  mental health treatment    Risks:  delay in care        { ED EMTALA TRANSFER CHOICES:[id number]}    I authorize the performance of emergency medical procedures and treatments upon me in both transit and upon arrival at the receiving facility  Additionally, I authorize the release of any and all medical records to the receiving facility and request they be transported with me, if possible  I understand that the safest mode of transportation during a medical emergency is an ambulance and that the Hospital advocates the use of this mode of transport  Risks of traveling to the receiving facility by car, including absence of medical control, life sustaining equipment, such as oxygen, and medical personnel has been explained to me and I fully understand them  (PRACHI CORRECT BOX BELOW)  [x  ]  I consent to the stated transfer and to be transported by ambulance/helicopter  [  ]  I consent to the stated transfer, but refuse transportation by ambulance and accept full responsibility for my transportation by car    I understand the risks of non-ambulance transfers and I exonerate the Hospital and its staff from any deterioration in my condition that results from this refusal     X___________________________________________    DATE  22  TIME________  Signature of patient or legally responsible individual signing on patient behalf           RELATIONSHIP TO PATIENT_____________self____________          Provider Certification    NAME Dionisio Baer                                        [birth year]LLI 2000 MRN [id number]    A medical screening exam was performed on the above named patient  Based on the examination:    Condition Necessitating Transfer The primary encounter diagnosis was Depression  Diagnoses of Suicidal ideations and Medical clearance for psychiatric admission were also pertinent to this visit  Patient Condition:  depression/ ptsd    Reason for Transfer:  inpatient treatment    Transfer Requirements: [facility]   · Space available and qualified personnel available for treatment as acknowledged by  intake   · Agreed to accept transfer and to provide appropriate medical treatment as acknowledged by        Ok Parsons PA-C  · Appropriate medical records of the examination and treatment of the patient are provided at the time of transfer   [address] [facility], [address] __ap_____  · Transfer will be performed by qualified personnel from  [address] [place] [address]   and appropriate transfer equipment as required, including the use of necessary and appropriate life support measures  Provider Certification: I have examined the patient and explained the following risks and benefits of being transferred/refusing transfer to the patient/family:         Based on these reasonable risks and benefits to the patient and/or the unborn child(jesse), and based upon the information available at the time of the patients examination, I certify that the medical benefits reasonably to be expected from the provision of appropriate medical treatments at another medical facility outweigh the increasing risks, if any, to the individuals medical condition, and in the case of labor to the unborn child, from effecting the transfer      X____________________________________________ DATE 08/18/22        TIME_______      ORIGINAL - SEND TO MEDICAL RECORDS   COPY - SEND WITH PATIENT DURING TRANSFER

## 2022-08-18 ENCOUNTER — HOSPITAL ENCOUNTER (INPATIENT)
Facility: HOSPITAL | Age: 22
LOS: 7 days | Discharge: HOME | DRG: 751 | End: 2022-08-25
Attending: STUDENT IN AN ORGANIZED HEALTH CARE EDUCATION/TRAINING PROGRAM | Admitting: STUDENT IN AN ORGANIZED HEALTH CARE EDUCATION/TRAINING PROGRAM
Payer: COMMERCIAL

## 2022-08-18 VITALS
SYSTOLIC BLOOD PRESSURE: 116 MMHG | DIASTOLIC BLOOD PRESSURE: 60 MMHG | WEIGHT: 152.56 LBS | RESPIRATION RATE: 16 BRPM | OXYGEN SATURATION: 99 % | HEART RATE: 72 BPM | TEMPERATURE: 98.3 F

## 2022-08-18 DIAGNOSIS — F33.2 SEVERE EPISODE OF RECURRENT MAJOR DEPRESSIVE DISORDER (HCC): Primary | ICD-10-CM

## 2022-08-18 DIAGNOSIS — F43.10 POST TRAUMATIC STRESS DISORDER: ICD-10-CM

## 2022-08-18 DIAGNOSIS — F17.200 NICOTINE DEPENDENCE: ICD-10-CM

## 2022-08-18 DIAGNOSIS — Z00.8 MEDICAL CLEARANCE FOR PSYCHIATRIC ADMISSION: ICD-10-CM

## 2022-08-18 LAB
AMPHETAMINES SERPL QL SCN: NEGATIVE
BARBITURATES UR QL: NEGATIVE
BENZODIAZ UR QL: NEGATIVE
COCAINE UR QL: NEGATIVE
EXT PREG TEST URINE: NEGATIVE
EXT. CONTROL ED NAV: NORMAL
METHADONE UR QL: NEGATIVE
OPIATES UR QL SCN: NEGATIVE
OXYCODONE+OXYMORPHONE UR QL SCN: NEGATIVE
PCP UR QL: NEGATIVE
SARS-COV-2 RNA RESP QL NAA+PROBE: NEGATIVE
THC UR QL: NEGATIVE

## 2022-08-18 PROCEDURE — 80307 DRUG TEST PRSMV CHEM ANLYZR: CPT | Performed by: EMERGENCY MEDICINE

## 2022-08-18 PROCEDURE — GZ59ZZZ INDIVIDUAL PSYCHOTHERAPY, PSYCHOPHYSIOLOGICAL: ICD-10-PCS | Performed by: STUDENT IN AN ORGANIZED HEALTH CARE EDUCATION/TRAINING PROGRAM

## 2022-08-18 PROCEDURE — 93005 ELECTROCARDIOGRAM TRACING: CPT

## 2022-08-18 PROCEDURE — GZHZZZZ GROUP PSYCHOTHERAPY: ICD-10-PCS | Performed by: STUDENT IN AN ORGANIZED HEALTH CARE EDUCATION/TRAINING PROGRAM

## 2022-08-18 RX ORDER — ACETAMINOPHEN 325 MG/1
650 TABLET ORAL EVERY 4 HOURS PRN
Status: CANCELLED | OUTPATIENT
Start: 2022-08-18

## 2022-08-18 RX ORDER — POLYETHYLENE GLYCOL 3350 17 G/17G
17 POWDER, FOR SOLUTION ORAL DAILY PRN
Status: DISCONTINUED | OUTPATIENT
Start: 2022-08-18 | End: 2022-08-25 | Stop reason: HOSPADM

## 2022-08-18 RX ORDER — LANOLIN ALCOHOL/MO/W.PET/CERES
3 CREAM (GRAM) TOPICAL
Status: DISCONTINUED | OUTPATIENT
Start: 2022-08-18 | End: 2022-08-25 | Stop reason: HOSPADM

## 2022-08-18 RX ORDER — LORAZEPAM 1 MG/1
1 TABLET ORAL
Status: CANCELLED | OUTPATIENT
Start: 2022-08-18

## 2022-08-18 RX ORDER — OLANZAPINE 5 MG/1
5 TABLET ORAL
Status: CANCELLED | OUTPATIENT
Start: 2022-08-18

## 2022-08-18 RX ORDER — OLANZAPINE 10 MG/1
5 INJECTION, POWDER, LYOPHILIZED, FOR SOLUTION INTRAMUSCULAR
Status: DISCONTINUED | OUTPATIENT
Start: 2022-08-18 | End: 2022-08-25 | Stop reason: HOSPADM

## 2022-08-18 RX ORDER — BISACODYL 10 MG
10 SUPPOSITORY, RECTAL RECTAL DAILY PRN
Status: CANCELLED | OUTPATIENT
Start: 2022-08-18

## 2022-08-18 RX ORDER — AMOXICILLIN 250 MG
1 CAPSULE ORAL DAILY PRN
Status: CANCELLED | OUTPATIENT
Start: 2022-08-18

## 2022-08-18 RX ORDER — ACETAMINOPHEN 325 MG/1
975 TABLET ORAL EVERY 6 HOURS PRN
Status: DISCONTINUED | OUTPATIENT
Start: 2022-08-18 | End: 2022-08-25 | Stop reason: HOSPADM

## 2022-08-18 RX ORDER — OLANZAPINE 10 MG/1
2.5 INJECTION, POWDER, LYOPHILIZED, FOR SOLUTION INTRAMUSCULAR
Status: DISCONTINUED | OUTPATIENT
Start: 2022-08-18 | End: 2022-08-25 | Stop reason: HOSPADM

## 2022-08-18 RX ORDER — OLANZAPINE 5 MG/1
2.5 TABLET ORAL
Status: CANCELLED | OUTPATIENT
Start: 2022-08-18

## 2022-08-18 RX ORDER — MINERAL OIL AND PETROLATUM 150; 830 MG/G; MG/G
1 OINTMENT OPHTHALMIC
Status: CANCELLED | OUTPATIENT
Start: 2022-08-18

## 2022-08-18 RX ORDER — BISACODYL 10 MG
10 SUPPOSITORY, RECTAL RECTAL DAILY PRN
Status: DISCONTINUED | OUTPATIENT
Start: 2022-08-18 | End: 2022-08-25 | Stop reason: HOSPADM

## 2022-08-18 RX ORDER — LORAZEPAM 2 MG/ML
1 INJECTION INTRAMUSCULAR EVERY 4 HOURS PRN
Status: DISCONTINUED | OUTPATIENT
Start: 2022-08-18 | End: 2022-08-25 | Stop reason: HOSPADM

## 2022-08-18 RX ORDER — LORAZEPAM 2 MG/ML
1 INJECTION INTRAMUSCULAR EVERY 4 HOURS PRN
Status: CANCELLED | OUTPATIENT
Start: 2022-08-18

## 2022-08-18 RX ORDER — POLYETHYLENE GLYCOL 3350 17 G/17G
17 POWDER, FOR SOLUTION ORAL DAILY PRN
Status: CANCELLED | OUTPATIENT
Start: 2022-08-18

## 2022-08-18 RX ORDER — HYDROXYZINE HYDROCHLORIDE 25 MG/1
25 TABLET, FILM COATED ORAL
Status: CANCELLED | OUTPATIENT
Start: 2022-08-18

## 2022-08-18 RX ORDER — LANOLIN ALCOHOL/MO/W.PET/CERES
3 CREAM (GRAM) TOPICAL
Status: CANCELLED | OUTPATIENT
Start: 2022-08-18

## 2022-08-18 RX ORDER — ACETAMINOPHEN 325 MG/1
975 TABLET ORAL EVERY 6 HOURS PRN
Status: CANCELLED | OUTPATIENT
Start: 2022-08-18

## 2022-08-18 RX ORDER — ACETAMINOPHEN 325 MG/1
650 TABLET ORAL EVERY 4 HOURS PRN
Status: DISCONTINUED | OUTPATIENT
Start: 2022-08-18 | End: 2022-08-25 | Stop reason: HOSPADM

## 2022-08-18 RX ORDER — ACETAMINOPHEN 325 MG/1
650 TABLET ORAL EVERY 6 HOURS PRN
Status: CANCELLED | OUTPATIENT
Start: 2022-08-18

## 2022-08-18 RX ORDER — MINERAL OIL AND PETROLATUM 150; 830 MG/G; MG/G
1 OINTMENT OPHTHALMIC
Status: DISCONTINUED | OUTPATIENT
Start: 2022-08-18 | End: 2022-08-25 | Stop reason: HOSPADM

## 2022-08-18 RX ORDER — OLANZAPINE 5 MG/1
5 TABLET ORAL
Status: DISCONTINUED | OUTPATIENT
Start: 2022-08-18 | End: 2022-08-25 | Stop reason: HOSPADM

## 2022-08-18 RX ORDER — MAGNESIUM HYDROXIDE/ALUMINUM HYDROXICE/SIMETHICONE 120; 1200; 1200 MG/30ML; MG/30ML; MG/30ML
30 SUSPENSION ORAL EVERY 4 HOURS PRN
Status: DISCONTINUED | OUTPATIENT
Start: 2022-08-18 | End: 2022-08-25 | Stop reason: HOSPADM

## 2022-08-18 RX ORDER — NICOTINE 21 MG/24HR
1 PATCH, TRANSDERMAL 24 HOURS TRANSDERMAL DAILY
Status: CANCELLED | OUTPATIENT
Start: 2022-08-18

## 2022-08-18 RX ORDER — ACETAMINOPHEN 325 MG/1
650 TABLET ORAL EVERY 6 HOURS PRN
Status: DISCONTINUED | OUTPATIENT
Start: 2022-08-18 | End: 2022-08-25 | Stop reason: HOSPADM

## 2022-08-18 RX ORDER — OLANZAPINE 2.5 MG/1
2.5 TABLET ORAL
Status: DISCONTINUED | OUTPATIENT
Start: 2022-08-18 | End: 2022-08-25 | Stop reason: HOSPADM

## 2022-08-18 RX ORDER — NICOTINE 21 MG/24HR
1 PATCH, TRANSDERMAL 24 HOURS TRANSDERMAL DAILY
Status: DISCONTINUED | OUTPATIENT
Start: 2022-08-18 | End: 2022-08-25 | Stop reason: HOSPADM

## 2022-08-18 RX ORDER — MAGNESIUM HYDROXIDE/ALUMINUM HYDROXICE/SIMETHICONE 120; 1200; 1200 MG/30ML; MG/30ML; MG/30ML
30 SUSPENSION ORAL EVERY 4 HOURS PRN
Status: CANCELLED | OUTPATIENT
Start: 2022-08-18

## 2022-08-18 RX ORDER — HYDROXYZINE 50 MG/1
50 TABLET, FILM COATED ORAL
Status: DISCONTINUED | OUTPATIENT
Start: 2022-08-18 | End: 2022-08-25 | Stop reason: HOSPADM

## 2022-08-18 RX ORDER — LORAZEPAM 2 MG/ML
2 INJECTION INTRAMUSCULAR
Status: DISCONTINUED | OUTPATIENT
Start: 2022-08-18 | End: 2022-08-25 | Stop reason: HOSPADM

## 2022-08-18 RX ORDER — OLANZAPINE 10 MG/1
2.5 INJECTION, POWDER, LYOPHILIZED, FOR SOLUTION INTRAMUSCULAR
Status: CANCELLED | OUTPATIENT
Start: 2022-08-18

## 2022-08-18 RX ORDER — LORAZEPAM 1 MG/1
1 TABLET ORAL
Status: DISCONTINUED | OUTPATIENT
Start: 2022-08-18 | End: 2022-08-25 | Stop reason: HOSPADM

## 2022-08-18 RX ORDER — AMOXICILLIN 250 MG
1 CAPSULE ORAL DAILY PRN
Status: DISCONTINUED | OUTPATIENT
Start: 2022-08-18 | End: 2022-08-25 | Stop reason: HOSPADM

## 2022-08-18 RX ORDER — LORAZEPAM 2 MG/ML
2 INJECTION INTRAMUSCULAR
Status: CANCELLED | OUTPATIENT
Start: 2022-08-18

## 2022-08-18 RX ORDER — HYDROXYZINE HYDROCHLORIDE 25 MG/1
25 TABLET, FILM COATED ORAL
Status: DISCONTINUED | OUTPATIENT
Start: 2022-08-18 | End: 2022-08-25 | Stop reason: HOSPADM

## 2022-08-18 RX ORDER — OLANZAPINE 10 MG/1
5 INJECTION, POWDER, LYOPHILIZED, FOR SOLUTION INTRAMUSCULAR
Status: CANCELLED | OUTPATIENT
Start: 2022-08-18

## 2022-08-18 RX ORDER — HYDROXYZINE 50 MG/1
50 TABLET, FILM COATED ORAL
Status: CANCELLED | OUTPATIENT
Start: 2022-08-18

## 2022-08-18 NOTE — ED NOTES
Insurance      EVS (Eligibility Verification System) called - [phone]    Automated system indicates: [facility]  ID # [id number]

## 2022-08-18 NOTE — PLAN OF CARE
Nursing team will meet with you twice a day to assess for suicidal thoughts, depression and anxiety  We will educate you on your illness, medications and help you develop alternative coping skills to deal with life stressors

## 2022-08-18 NOTE — ED NOTES
Pt sleeping, respirations wnl  Pt stable   1:1 observation continues      Arely Roper, [facility]  08/18/22 0612

## 2022-08-18 NOTE — ED NOTES
This RN assumes care of patient at this time  Pt appears to be sleeping with no signs or symptoms of respiratory distress   1:1 at bedside, this RN will continue to monitor        Wilfredo Ball RN  08/18/22 2233

## 2022-08-18 NOTE — ED NOTES
Insurance Authorization for admission:   Phone call placed to [facility]   Phone number: [phone]    Spoke to Linda Cast     3 days approved  Level of care: inpatient mental health  Review on Monday 8/22  Authorization # **     To be given on admission

## 2022-08-18 NOTE — ED NOTES
Received report on pt  Pt resting quietly  Mother at bedside   1:1 sitter present      Tarik Stiles RN  08/18/22 7581
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
## 2022-08-18 NOTE — ED NOTES
Pt sleeping, respirations wnl  Pt stable   1:1 observation continues      Fernanda Harrison, [address] [place] [address]  08/18/22 1013
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
## 2022-08-18 NOTE — PROGRESS NOTES
1 hooded sweatshirt  6 shirts  6 pants  8 underwear  6 pairs socks  1 bra  1 shampoo  1 conditioner  1 body wash  1 deodorant  1 all purpose cream  1 hairbrush  1 toothbrush and paste  1 green bag  1 pair pink slides  1 underwear  1 bra
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
## 2022-08-18 NOTE — ED NOTES
Patient is accepted at [facility] Patient is accepted by Airam Kirby PA-C    Transportation is arranged with [facility]    Transportation is scheduled for 200      Nurse report is to be called to [phone] prior to patient transfer

## 2022-08-18 NOTE — ED NOTES
Pt appears to be  Resting comfortably with no signs or symptoms of respiratory distress   at bedside for continuous observation        Little Carrel, RN  08/18/22 1037

## 2022-08-18 NOTE — ED NOTES
Pt sleeping, respirations wnl  Pt stable   1:1 observation continues      Barbie Seymour, [facility]  08/18/22 7176

## 2022-08-19 PROBLEM — Z00.8 MEDICAL CLEARANCE FOR PSYCHIATRIC ADMISSION: Status: ACTIVE | Noted: 2022-08-19

## 2022-08-19 PROBLEM — F32.A DEPRESSION: Status: ACTIVE | Noted: 2022-08-19

## 2022-08-19 PROBLEM — F43.0 ACUTE STRESS DISORDER: Status: ACTIVE | Noted: 2022-08-19

## 2022-08-19 PROBLEM — F41.9 ANXIETY: Status: ACTIVE | Noted: 2022-08-19

## 2022-08-19 PROBLEM — F43.10 POST TRAUMATIC STRESS DISORDER: Status: ACTIVE | Noted: 2022-08-19

## 2022-08-19 PROBLEM — F33.2 SEVERE EPISODE OF RECURRENT MAJOR DEPRESSIVE DISORDER (HCC): Status: ACTIVE | Noted: 2022-08-19

## 2022-08-19 LAB
ALBUMIN SERPL BCP-MCNC: 3.8 G/DL (ref 3.5–5)
ALP SERPL-CCNC: 65 U/L (ref 46–116)
ALT SERPL W P-5'-P-CCNC: 17 U/L (ref 12–78)
ANION GAP SERPL CALCULATED.3IONS-SCNC: 10 MMOL/L (ref 4–13)
AST SERPL W P-5'-P-CCNC: 13 U/L (ref 5–45)
ATRIAL RATE: 73 BPM
BASOPHILS # BLD AUTO: 0.03 THOUSANDS/ΜL (ref 0–0.1)
BASOPHILS NFR BLD AUTO: 1 % (ref 0–1)
BILIRUB SERPL-MCNC: 0.4 MG/DL (ref 0.2–1)
BUN SERPL-MCNC: 10 MG/DL (ref 5–25)
CALCIUM SERPL-MCNC: 9.2 MG/DL (ref 8.3–10.1)
CHLORIDE SERPL-SCNC: 104 MMOL/L (ref 96–108)
CHOLEST SERPL-MCNC: 125 MG/DL
CO2 SERPL-SCNC: 27 MMOL/L (ref 21–32)
CREAT SERPL-MCNC: 0.87 MG/DL (ref 0.6–1.3)
EOSINOPHIL # BLD AUTO: 0.05 THOUSAND/ΜL (ref 0–0.61)
EOSINOPHIL NFR BLD AUTO: 1 % (ref 0–6)
ERYTHROCYTE [DISTWIDTH] IN BLOOD BY AUTOMATED COUNT: 14.1 % (ref 11.6–15.1)
EST. AVERAGE GLUCOSE BLD GHB EST-MCNC: 103 MG/DL
GFR SERPL CREATININE-BSD FRML MDRD: 95 ML/MIN/1.73SQ M
GLUCOSE P FAST SERPL-MCNC: 78 MG/DL (ref 65–99)
GLUCOSE SERPL-MCNC: 78 MG/DL (ref 65–140)
HBA1C MFR BLD: 5.2 %
HCG SERPL QL: NEGATIVE
HCT VFR BLD AUTO: 43.5 % (ref 34.8–46.1)
HDLC SERPL-MCNC: 54 MG/DL
HGB BLD-MCNC: 13.7 G/DL (ref 11.5–15.4)
IMM GRANULOCYTES # BLD AUTO: 0.01 THOUSAND/UL (ref 0–0.2)
IMM GRANULOCYTES NFR BLD AUTO: 0 % (ref 0–2)
LDLC SERPL CALC-MCNC: 63 MG/DL (ref 0–100)
LYMPHOCYTES # BLD AUTO: 3.03 THOUSANDS/ΜL (ref 0.6–4.47)
LYMPHOCYTES NFR BLD AUTO: 54 % (ref 14–44)
MCH RBC QN AUTO: 28.8 PG (ref 26.8–34.3)
MCHC RBC AUTO-ENTMCNC: 31.5 G/DL (ref 31.4–37.4)
MCV RBC AUTO: 92 FL (ref 82–98)
MONOCYTES # BLD AUTO: 0.41 THOUSAND/ΜL (ref 0.17–1.22)
MONOCYTES NFR BLD AUTO: 7 % (ref 4–12)
NEUTROPHILS # BLD AUTO: 2.07 THOUSANDS/ΜL (ref 1.85–7.62)
NEUTS SEG NFR BLD AUTO: 37 % (ref 43–75)
NONHDLC SERPL-MCNC: 71 MG/DL
NRBC BLD AUTO-RTO: 0 /100 WBCS
P AXIS: 28 DEGREES
PLATELET # BLD AUTO: 326 THOUSANDS/UL (ref 149–390)
PMV BLD AUTO: 9.8 FL (ref 8.9–12.7)
POTASSIUM SERPL-SCNC: 4 MMOL/L (ref 3.5–5.3)
PR INTERVAL: 156 MS
PROT SERPL-MCNC: 7.8 G/DL (ref 6.4–8.4)
QRS AXIS: 0 DEGREES
QRSD INTERVAL: 80 MS
QT INTERVAL: 402 MS
QTC INTERVAL: 442 MS
RBC # BLD AUTO: 4.75 MILLION/UL (ref 3.81–5.12)
SODIUM SERPL-SCNC: 141 MMOL/L (ref 135–147)
T WAVE AXIS: 4 DEGREES
TRIGL SERPL-MCNC: 41 MG/DL
TSH SERPL DL<=0.05 MIU/L-ACNC: 1.36 UIU/ML (ref 0.45–4.5)
VENTRICULAR RATE: 73 BPM
WBC # BLD AUTO: 5.6 THOUSAND/UL (ref 4.31–10.16)

## 2022-08-19 PROCEDURE — 84703 CHORIONIC GONADOTROPIN ASSAY: CPT | Performed by: PHYSICIAN ASSISTANT

## 2022-08-19 PROCEDURE — 99253 IP/OBS CNSLTJ NEW/EST LOW 45: CPT | Performed by: PHYSICIAN ASSISTANT

## 2022-08-19 PROCEDURE — 99221 1ST HOSP IP/OBS SF/LOW 40: CPT | Performed by: STUDENT IN AN ORGANIZED HEALTH CARE EDUCATION/TRAINING PROGRAM

## 2022-08-19 PROCEDURE — 80061 LIPID PANEL: CPT | Performed by: PHYSICIAN ASSISTANT

## 2022-08-19 PROCEDURE — 84443 ASSAY THYROID STIM HORMONE: CPT | Performed by: PHYSICIAN ASSISTANT

## 2022-08-19 PROCEDURE — 85025 COMPLETE CBC W/AUTO DIFF WBC: CPT | Performed by: PHYSICIAN ASSISTANT

## 2022-08-19 PROCEDURE — 93010 ELECTROCARDIOGRAM REPORT: CPT | Performed by: INTERNAL MEDICINE

## 2022-08-19 PROCEDURE — 86592 SYPHILIS TEST NON-TREP QUAL: CPT | Performed by: PHYSICIAN ASSISTANT

## 2022-08-19 PROCEDURE — 80053 COMPREHEN METABOLIC PANEL: CPT | Performed by: PHYSICIAN ASSISTANT

## 2022-08-19 PROCEDURE — 83036 HEMOGLOBIN GLYCOSYLATED A1C: CPT | Performed by: PHYSICIAN ASSISTANT

## 2022-08-19 RX ORDER — MIRTAZAPINE 15 MG/1
15 TABLET, FILM COATED ORAL
Status: DISCONTINUED | OUTPATIENT
Start: 2022-08-19 | End: 2022-08-20

## 2022-08-19 RX ADMIN — MIRTAZAPINE 15 MG: 15 TABLET, FILM COATED ORAL at 21:40

## 2022-08-19 RX ADMIN — NICOTINE 1 PATCH: 14 PATCH, EXTENDED RELEASE TRANSDERMAL at 09:31
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
## 2022-08-19 NOTE — ASSESSMENT & PLAN NOTE
· Vital signs stable at time of assessment  · Lab work pending to be reviewed  · (CMP, lipid panel, CBC, TSH, hCG qualitative, A1c, RPR)  · Pregnancy test - 08/18/2022 is negative  · ETOH  08/18/2022 is negative  · Urine drug screen 08/18/2022 is negative  · COVID/flu/RSV on 08/17/2022 is negative  · EKG: NSR normal QTc HR 73  · Patient medically stable at this time for inpatient psychiatric treatment
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
## 2022-08-19 NOTE — CMS CERTIFICATION NOTE
Recertification: Based upon physical, mental and social evaluations, I certify that inpatient psychiatric services continue to be medically necessary for this patient for a duration of 7 midnights for the treatment of  Severe episode of recurrent major depressive disorder ([facility] [address] )   Available alternative community resources still do not meet the patient's mental health care needs  I further attest that an established written individualized plan of care has been updated and is outlined in the patient's medical records
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
## 2022-08-20 PROCEDURE — 99232 SBSQ HOSP IP/OBS MODERATE 35: CPT | Performed by: PSYCHIATRY & NEUROLOGY

## 2022-08-20 RX ORDER — PRAZOSIN HYDROCHLORIDE 1 MG/1
1 CAPSULE ORAL
Status: DISCONTINUED | OUTPATIENT
Start: 2022-08-20 | End: 2022-08-25 | Stop reason: HOSPADM

## 2022-08-20 RX ORDER — PRAZOSIN HYDROCHLORIDE 1 MG/1
1 CAPSULE ORAL
Status: DISCONTINUED | OUTPATIENT
Start: 2022-08-20 | End: 2022-08-20

## 2022-08-20 RX ADMIN — PRAZOSIN HYDROCHLORIDE 1 MG: 1 CAPSULE ORAL at 21:33

## 2022-08-20 RX ADMIN — SERTRALINE HYDROCHLORIDE 50 MG: 50 TABLET, FILM COATED ORAL at 21:33

## 2022-08-20 NOTE — PLAN OF CARE
Problem: SELF HARM/SUICIDALITY  Goal: Will have no self-injury during hospital stay  Description: INTERVENTIONS:  - Q 15 MINUTES: Routine safety checks  - Q WAKING SHIFT & PRN: Assess risk to determine if routine checks are adequate to maintain patient safety  - Encourage patient to participate actively in care by formulating a plan to combat response to suicidal ideation, identify supports and resources  Outcome: Progressing     Problem: DEPRESSION  Goal: Will be euthymic at discharge  Description: INTERVENTIONS:  - Administer medication as ordered  - Provide emotional support via 1:1 interaction with staff  - Encourage involvement in milieu/groups/activities  - Monitor for social isolation  Outcome: Progressing     Problem: ANXIETY  Goal: Will report anxiety at manageable levels  Description: INTERVENTIONS:  - Administer medication as ordered  - Teach and encourage coping skills  - Provide emotional support  - Assess patient/family for anxiety and ability to cope  Outcome: Progressing  Goal: By discharge: Patient will verbalize 2 strategies to deal with anxiety  Description: Interventions:  - Identify any obvious source/trigger to anxiety  - Staff will assist patient in applying identified coping technique/skills  - Encourage attendance of scheduled groups and activities  Outcome: Progressing     Problem: Nutrition/Hydration-ADULT  Goal: Nutrient/Hydration intake appropriate for improving, restoring or maintaining nutritional needs  Description: Monitor and assess patient's nutrition/hydration status for malnutrition  Collaborate with interdisciplinary team and initiate plan and interventions as ordered  Monitor patient's weight and dietary intake as ordered or per policy  Utilize nutrition screening tool and intervene as necessary  Determine patient's food preferences and provide high-protein, high-caloric foods as appropriate       INTERVENTIONS:  - Monitor oral intake, urinary output, labs, and treatment plans  - Assess nutrition and hydration status and recommend course of action  - Evaluate amount of meals eaten  - Assist patient with eating if necessary   - Allow adequate time for meals  - Recommend/ encourage appropriate diets, oral nutritional supplements, and vitamin/mineral supplements  - Order, calculate, and assess calorie counts as needed  - Recommend, monitor, and adjust tube feedings and TPN/PPN based on assessed needs  - Assess need for intravenous fluids  - Provide specific nutrition/hydration education as appropriate  - Include patient/family/caregiver in decisions related to nutrition  Outcome: Progressing     Problem: DISCHARGE PLANNING  Goal: Discharge to home or other facility with appropriate resources  Description: INTERVENTIONS:  - Identify barriers to discharge w/patient and caregiver  - Arrange for needed discharge resources and transportation as appropriate  - Identify discharge learning needs (meds, wound care, etc )  - Arrange for interpretive services to assist at discharge as needed  - Refer to Case Management Department for coordinating discharge planning if the patient needs post-hospital services based on physician/advanced practitioner order or complex needs related to functional status, cognitive ability, or social support system  Outcome: Progressing
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
## 2022-08-20 NOTE — NURSING NOTE
Pt is visible on unit, attending groups and meals  Pt reports improved sleep last night after starting Remeron  Education and handout provided  No questions at this time   Improved mood, no SI

## 2022-08-20 NOTE — NURSING NOTE
Patient visible throughout the evening, attended evening group  Pleasant, calm, and cooperative during conversation  Denies SI/HI, no hallucinations  Patient had several questions about unit therapy treatment, inquiring if there is only groups or is there individual therapy  This writer was able to explain the acute unit treatment plan and patient would be able to discuss discharge planning with CM early next week  Patient interested in having out patient services to help deal with her situation  Patient appears with a bright affect during conversation  Reports eating and sleeping well  No other questions or concerns at this time
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
## 2022-08-21 LAB — RPR SER QL: NORMAL

## 2022-08-21 PROCEDURE — 99232 SBSQ HOSP IP/OBS MODERATE 35: CPT | Performed by: PSYCHIATRY & NEUROLOGY

## 2022-08-21 RX ORDER — MIRTAZAPINE 15 MG/1
15 TABLET, FILM COATED ORAL
Status: DISCONTINUED | OUTPATIENT
Start: 2022-08-21 | End: 2022-08-25 | Stop reason: HOSPADM

## 2022-08-21 RX ADMIN — HYDROXYZINE HYDROCHLORIDE 50 MG: 50 TABLET, FILM COATED ORAL at 17:35

## 2022-08-21 RX ADMIN — SERTRALINE HYDROCHLORIDE 50 MG: 50 TABLET, FILM COATED ORAL at 21:35

## 2022-08-21 RX ADMIN — NICOTINE POLACRILEX 4 MG: 4 GUM, CHEWING BUCCAL at 18:47

## 2022-08-21 RX ADMIN — PRAZOSIN HYDROCHLORIDE 1 MG: 1 CAPSULE ORAL at 21:34

## 2022-08-21 RX ADMIN — MIRTAZAPINE 15 MG: 15 TABLET, FILM COATED ORAL at 21:35

## 2022-08-21 NOTE — PROGRESS NOTES
Progress Note - Behavioral Health   Raymodn Inman 24 y o  female MRN: [id number]  Unit/Bed#: U 258-02 Encounter: [id number]    Assessment/Plan   Principal Problem:    Post traumatic stress disorder  Active Problems:    Depression    Medical clearance for psychiatric admission    Severe episode of recurrent major depressive disorder (HCC)    Anxiety    Acute stress disorder    PLANS  Complaint of lack of sleep thru the night-Restarted Mirtazapine   Continue Sertraline 50mg , Prazosin 1mg      Behavior over the last 24 hours:  improved  Sleep: insomnia  Appetite: normal  Medication side effects: No  ROS: no complaints    Subjective:   Mag was visible on the unit  Was seen individual later individually  Reported not sleeping well last night , " tossing and turning, was not thinking about her ex- or trauma  Unsure why she could not sleep  Started Minipress and Sertraline 50mg but unsure if medication side effect   Was informed of likely reason as to stopping MIrtazpaine  Otherwise Whitney Mejía is without SI, HI  Was seen talking on the phone earlier today  Reported speaking with her mother Lockie Lover) and sisterRupert Dies)  Family is supportive, has mental health problems also, PTSD, substance hisory (mother), Bipolar ( sister)   Whitney Mejía inquired about discharge, worries,  ' have to return to work'  [de-identified] she's welcomed to return to live w/ the mother  Mental Status Evaluation:    Mental Status Evaluation:  Appearance:  Whitney Mejía is a 24 yr old [place] female/ look stated age, casually dressed and non disheveled      Behavior:  cooperative    Speech:  normal pitch, normal volume and WNL rate, prosody    Mood:  Normal,    Affect:  mood-congruent, euthymic   Thought Process:  Logical and linear, coherant, slightly tangential    Thought Content:  normal   Perceptual Disturbances: None   Risk Potential: Suicidal Ideations none  Homicidal Ideations none  Potential for Aggression No   Sensorium:  person, place, time/date and situation   Memory:  recent and remote memory grossly intact   Consciousness:  alert and awake    Attention: attention span and concentration were age appropriate   Insight:  limited   Judgment: limited   Gait/Station: seen sitting / unable to assess   Motor Activity: no abnormal movements, seen moving upper and lower extremities without distress         Progress Toward Goals: Improving slightly     Recommended Treatment: Continue with group therapy, milieu therapy and occupational therapy  Risks, benefits and possible side effects of Medications:   Risks, benefits, and possible side effects of medications explained to patient and patient verbalizes understanding  Medications:   all current active meds have been reviewed and current meds:     Vitals:    08/21/22 1551   BP: 100/77   Pulse: 77   Resp: 16   Temp: (!) 97 3 °F (36 3 °C)   SpO2: 96%       Labs: I have personally reviewed all pertinent laboratory/tests results  Most Recent Labs:   Lab Results   Component Value Date    WBC 5 60 08/19/2022    RBC 4 75 08/19/2022    HGB 13 7 08/19/2022    HCT 43 5 08/19/2022     08/19/2022    RDW 14 1 08/19/2022    NEUTROABS 2 07 08/19/2022    SODIUM 141 08/19/2022    K 4 0 08/19/2022     08/19/2022    CO2 27 08/19/2022    BUN 10 08/19/2022    CREATININE 0 87 08/19/2022    GLUC 78 08/19/2022    GLUF 78 08/19/2022    CALCIUM 9 2 08/19/2022    AST 13 08/19/2022    ALT 17 08/19/2022    ALKPHOS 65 08/19/2022    TP 7 8 08/19/2022    ALB 3 8 08/19/2022    TBILI 0 40 08/19/2022    CHOLESTEROL 125 08/19/2022    HDL 54 08/19/2022    TRIG 41 08/19/2022    LDLCALC 63 08/19/2022    NONHDLC 71 08/19/2022    GIY4OIXARFHP 1 363 08/19/2022    FREET4 1 04 04/04/2017    PREGSERUM Negative 08/19/2022    HCGQUANT <2 07/07/2022    RPR Non-Reactive 08/19/2022    HGBA1C 5 2 08/19/2022     08/19/2022       Counseling / Coordination of Care  Total floor / unit time spent today 15 minutes   Greater than 50% of total time was spent with the patient and / or family counseling and / or coordination of care   A description of the counseling / coordination of care:Treatment plan and  Unit  supports
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
## 2022-08-21 NOTE — NURSING NOTE
Pt resting in bed throughout morning/early afternoon  Pt reports poor sleep overnight  Discussed newly prescribed meds, pt acknowledges  OOB meals, no groups this morning due to feeling tired   Denies SI

## 2022-08-22 PROCEDURE — 99232 SBSQ HOSP IP/OBS MODERATE 35: CPT | Performed by: PHYSICIAN ASSISTANT

## 2022-08-22 RX ADMIN — MIRTAZAPINE 15 MG: 15 TABLET, FILM COATED ORAL at 21:33

## 2022-08-22 RX ADMIN — NICOTINE 1 PATCH: 14 PATCH, EXTENDED RELEASE TRANSDERMAL at 09:19

## 2022-08-22 RX ADMIN — PRAZOSIN HYDROCHLORIDE 1 MG: 1 CAPSULE ORAL at 21:32

## 2022-08-22 NOTE — NURSING NOTE
Awake and alert  Restless sleep  Refused breakfast   Denies SI/HI and hallucinations  Improved depression and anxiety  Social with peers and attends groups  Complaint with medications, denies side effects

## 2022-08-23 PROCEDURE — 99232 SBSQ HOSP IP/OBS MODERATE 35: CPT | Performed by: PHYSICIAN ASSISTANT

## 2022-08-23 RX ADMIN — SERTRALINE HYDROCHLORIDE 50 MG: 50 TABLET, FILM COATED ORAL at 10:03

## 2022-08-23 RX ADMIN — NICOTINE 1 PATCH: 14 PATCH, EXTENDED RELEASE TRANSDERMAL at 08:59

## 2022-08-23 RX ADMIN — PRAZOSIN HYDROCHLORIDE 1 MG: 1 CAPSULE ORAL at 21:20

## 2022-08-23 RX ADMIN — MIRTAZAPINE 15 MG: 15 TABLET, FILM COATED ORAL at 21:20

## 2022-08-23 RX ADMIN — NICOTINE POLACRILEX 4 MG: 4 GUM, CHEWING BUCCAL at 13:05

## 2022-08-23 NOTE — NURSING NOTE
Patient has been observed out in milieu, social with peers and reports overall improvement in symptoms  Pt displays improvement in affect as well, and expressed interest in attending OP therapy upon discharge to assist in continued improvement and stability with MH  Denies SI, HI and reports sleep has improved slightly, but appetite has improved significantly since coming in

## 2022-08-23 NOTE — PROGRESS NOTES
Progress Note - Demetrio Ruiz 24 y o  female MRN: [id number]   Unit/Bed#: [facility] 258-02 Encounter: [id number]    Behavior over the last 24 hours: slowly improving  Jeremy Murdock is a 24-year-old female with a history of depression and PTSD who presents for psychiatric follow-up  Staff reports no behavioral issues overnight  Patient is pleasant, calm and cooperative upon approach  Reports sleeping better last night, Zoloft now moved to the daytime and tolerating well  She is feeling less depressed, less anxious and is experiencing fewer nightmares and flashbacks  She is feeling more hopeful about the future and specifically wants to go back to school to get her GED  She did notice 1 brief moment of feeling like my heart was racing roughly 15 minutes after taking her Remeron last evening  It was not associated with any shortness of breath, chest pain, dizziness or nausea  Denies any SI or HI  No AH or VH  Sleep: slept better  Appetite: normal  Medication side effects: Yes - brief episode of palpitations   ROS: all other systems are negative    Mental Status Evaluation:    Appearance: casually dressed, appears consistent with stated age, normal grooming  Motor: no psychomotor disturbances, no gait abnormalities  Behavior: calm, cooperative, interacts with this writer appropriately  Speech: normal rate, rhythm, and volume  Mood: "little better" less depressed, less anxious  Affect: appropriate, normal range and intensity, mood-congruent, slightly brighter  Thought Process: organized, linear, and goal-oriented; intact associations  Thought Content: denies any delusional material, no preoccupation  Perception: denies any auditory or visual hallucinations, denies other perceptual disturbances  Risk Potential: denies suicidal ideation, plan, or intent   Denies homicidal ideation  Sensorium: Oriented to person, place, time, and situation  Cognition: cognitive ability appears intact but was not quantitatively tested  Consciousness: alert and awake  Attention/Concentration: able to focus without difficulty, attention and concentration are age appropriate  Insight: improved  Judgement: improved    Vital signs in last 24 hours:    Temp:  [97 7 °F (36 5 °C)-98 °F (36 7 °C)] 98 °F (36 7 °C)  HR:  [78-81] 81  Resp:  [16-17] 16  BP: (108-122)/(74-81) 108/74    Laboratory results: I have personally reviewed all pertinent laboratory/tests results    Results from the past 24 hours: No results found for this or any previous visit (from the past 24 hour(s))  Most Recent Labs:   Lab Results   Component Value Date    WBC 5 60 08/19/2022    RBC 4 75 08/19/2022    HGB 13 7 08/19/2022    HCT 43 5 08/19/2022     08/19/2022    RDW 14 1 08/19/2022    NEUTROABS 2 07 08/19/2022    SODIUM 141 08/19/2022    K 4 0 08/19/2022     08/19/2022    CO2 27 08/19/2022    BUN 10 08/19/2022    CREATININE 0 87 08/19/2022    GLUC 78 08/19/2022    CALCIUM 9 2 08/19/2022    AST 13 08/19/2022    ALT 17 08/19/2022    ALKPHOS 65 08/19/2022    TP 7 8 08/19/2022    ALB 3 8 08/19/2022    TBILI 0 40 08/19/2022    CHOLESTEROL 125 08/19/2022    HDL 54 08/19/2022    TRIG 41 08/19/2022    LDLCALC 63 08/19/2022    NONHDLC 71 08/19/2022    XIT2ZAHYWWUF 1 363 08/19/2022    FREET4 1 04 04/04/2017    PREGUR negative 08/18/2022    PREGSERUM Negative 08/19/2022    HCGQUANT <2 07/07/2022    RPR Non-Reactive 08/19/2022    HGBA1C 5 2 08/19/2022     08/19/2022       Progress Toward Goals: progressing    Assessment/Plan   Principal Problem:    Post traumatic stress disorder  Active Problems:    Depression    Medical clearance for psychiatric admission    Severe episode of recurrent major depressive disorder ([facility] [address] )    Anxiety    Acute stress disorder      Recommended Treatment:     Planned medication and treatment changes:     All current active medications have been reviewed  Encourage group therapy, milieu therapy and occupational therapy  Behavioral Health checks every 7 minutes    Continue current medications  Patient appears to be progressing, consider discharge for Thursday  Regarding brief episode of heart racing last night, suspect this is likely related to anxiety  Her EKG demonstrated normal sinus rhythm upon admission and she states she has experienced similar symptoms in the past when feeling anxious  Advised patient to notify staff if symptoms recur in the future      Current Facility-Administered Medications   Medication Dose Route Frequency Provider Last Rate    acetaminophen  650 mg Oral Q6H PRN NIKO Torres-RODRICK      acetaminophen  650 mg Oral Q4H PRN Deion Cedillo, PA-C      acetaminophen  975 mg Oral Q6H PRN Deion Cedillo, PA-C      aluminum-magnesium hydroxide-simethicone  30 mL Oral Q4H PRN NIKO Torres-RODRICK      artificial tear  1 application Both Eyes E7F PRN Deion Cedillo PA-C      bisacodyl  10 mg Rectal Daily PRN NIKO Torres-RODRICK      hydrOXYzine HCL  25 mg Oral Q6H PRN Max 4/day NIKO Torres-RODRICK      hydrOXYzine HCL  50 mg Oral Q4H PRN Max 4/day Deion Cedillo PA-C      Or    LORazepam  1 mg Intramuscular Q4H PRN Deion Cedillo PA-C      LORazepam  1 mg Oral Q4H PRN Max 6/day Deion Cedillo PA-C      Or    LORazepam  2 mg Intramuscular Q6H PRN Max 3/day Deion Cedillo PA-C      melatonin  3 mg Oral HS PRN NIKO Torres-RODRICK      mirtazapine  15 mg Oral HS Gayminesh Ceralexus, DO      nicotine  1 patch Transdermal Daily Deion Cedillo PA-C      nicotine polacrilex  4 mg Oral Q2H PRN NIKO Torres-RODRICK      OLANZapine  5 mg Oral Q4H PRN Max 3/day Deion Cedillo PA-C      Or    OLANZapine  2 5 mg Intramuscular Q4H PRN Max 3/day NIKO Torres-RODRICK      OLANZapine  5 mg Oral Q3H PRN Max 3/day NIKO Torres-RODRICK      Or    OLANZapine  5 mg Intramuscular Q3H PRN Max 3/day Deion Cedillo PA-C      OLANZapine  2 5 mg Oral Q4H PRN Max 6/day Deion Cedillo BLANK      polyethylene glycol  17 g Oral Daily PRN Phyllis Perdomo PA-C      prazosin  1 mg Oral HS Lynette Phan DO      senna-docusate sodium  1 tablet Oral Daily PRN Phyllis Perdomo PA-C      sertraline  50 mg Oral Daily Phyllis Perdomo PA-C       Risks / Benefits of Treatment:    Risks, benefits, and possible side effects of medications explained to patient and patient verbalizes understanding and agreement for treatment  Counseling / Coordination of Care:    Patient's progress discussed with staff in treatment team meeting  Medications, treatment progress and treatment plan reviewed with patient      Phyllis Perdomo PA-C 08/23/22

## 2022-08-23 NOTE — NURSING NOTE
Awake and alert  Present in milieu, social with select peers  Denies SI/HI  Reports feeling "amazing" and excited for discharge on Thursday  Compliant with medications, denies side effects  No questions or concerns

## 2022-08-23 NOTE — NURSING NOTE
Pt is walking halls with peers, pleasant  Denies Si, feels that sleep is improved  No questions or concerns  Medication adjustments have been helpful  Laughing and joking with staff and peers

## 2022-08-24 PROBLEM — Z00.8 MEDICAL CLEARANCE FOR PSYCHIATRIC ADMISSION: Status: RESOLVED | Noted: 2022-08-19 | Resolved: 2022-08-24

## 2022-08-24 PROBLEM — F32.A DEPRESSION: Status: RESOLVED | Noted: 2022-08-19 | Resolved: 2022-08-24

## 2022-08-24 PROBLEM — F43.0 ACUTE STRESS DISORDER: Status: RESOLVED | Noted: 2022-08-19 | Resolved: 2022-08-24

## 2022-08-24 PROCEDURE — 99232 SBSQ HOSP IP/OBS MODERATE 35: CPT | Performed by: PHYSICIAN ASSISTANT

## 2022-08-24 RX ADMIN — SERTRALINE HYDROCHLORIDE 50 MG: 50 TABLET, FILM COATED ORAL at 09:08

## 2022-08-24 RX ADMIN — NICOTINE 1 PATCH: 14 PATCH, EXTENDED RELEASE TRANSDERMAL at 09:08

## 2022-08-24 RX ADMIN — PRAZOSIN HYDROCHLORIDE 1 MG: 1 CAPSULE ORAL at 21:33

## 2022-08-24 RX ADMIN — MIRTAZAPINE 15 MG: 15 TABLET, FILM COATED ORAL at 21:33
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
## 2022-08-24 NOTE — DISCHARGE INSTR - APPOINTMENTS
You will be discharged to: [address], [address] [place] [address]  You confirmed that your cell phone number is: [phone]        Parker Lawrence or Philomena, [address], will be calling you after your discharge, on the phone number that you provided  They will be available as an additional support, if needed  If you wish to speak with one of them, you may contact Anneliese Lynch at [phone] or Shahnaz Maldonado at [phone]

## 2022-08-24 NOTE — NURSING NOTE
Hector Patient was observed out and about within milieu, walking and talking with her peers, having pleasant and bright affect, reporting hope for the future and a big improvement since her admission  Pt happy to report improved sleep and finally having a consistent ability to eat, which she was unable to do pta, as well as "not one suicidal thought at all "  RN congratulated pt on progress and asked if she had any concerns with tx or meds  Hector Patient discussed one mild concern, for an increased HR prior to falling asleep at night after taking Remeron  Pt reports this happened twice this week so far and that she has made both providers aware  RN asked pt about any additional sxns, she denied any pain/discomfort accompanying this  Pt was asked to inform staff next time, if it happens again

## 2022-08-24 NOTE — BH TRANSITION RECORD
Contact Information: If you have any questions, concerns, pended studies, tests and/or procedures, or emergencies regarding your inpatient behavioral health visit  Please contact [facility] behavioral health unit [phone] and ask to speak to a , nurse or physician  A contact is available 24 hours/ 7 days a week at this number  Summary of Procedures Performed During your Stay:  Below is a list of major procedures performed during your hospital stay and a summary of results:  - Cardiac Procedures/Studies: ekg  Pending Studies (From admission, onward)    None        Please follow up on the above pending studies with your PCP and/or referring provider
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
## 2022-08-24 NOTE — PROGRESS NOTES
Diagnosis of Severe episode of recurrent major depressive disorder reviewed  Short term goals for decrease in depressive symptoms, decrease in anxiety symptoms, decrease in suicidal thoughts, improvement in self care, sleep improvement, improvement in appetite discussed  All present parties in agreement and treatment plan signed        08/19/22 5740   Team Meeting   Meeting Type Tx Team Meeting   Team Members Present   Team Members Present Physician;Nurse;   Physician Team Member Dr Zachary Handley Team Member [address] Management Team Member Alecia   Patient/Family Present   Patient Present No  (Pt declined to meet with treatment team)   Patient's Family Present No
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
## 2022-08-24 NOTE — PROGRESS NOTES
Pt pleasant, social      DC: Thursday 08/24/22 0804   Team Meeting   Meeting Type Daily Rounds   Team Members Present   Team Members Present Physician;Nurse;; Other (Discipline and Name)   Physician Team Member Dr Fermín Da Silva / Olvin Setting Team Member Emily Yeung / Dionte Hess Management Team Member Hetal Fajardo / Estefany Crow   Other (Discipline and Name) Doyce Raddle - Art Therapy   Patient/Family Present   Patient Present No   Patient's Family Present No

## 2022-08-24 NOTE — PROGRESS NOTES
Pt experienced difficult night, denies SI/HI  Tearful about relationship  Reported that she does feel better  Slept  DC: Thurs/Friday (?)   08/23/22 0805   Team Meeting   Meeting Type Daily Rounds   Team Members Present   Team Members Present Physician;Nurse;; Other (Discipline and Name)   Physician Team Member Dr Kandy Mon / Jovani New Team Member Pat Lanza / Ishaan Pond Management Team Member Sudeep Daniel   Other (Discipline and Name) Arva Cola - Art Therapy   Patient/Family Present   Patient Present No   Patient's Family Present No n/a

## 2022-08-24 NOTE — PROGRESS NOTES
Pt was upset on the phone with her mother, took Atarax  Attends groups  Slept  DC: TBD    08/22/22 0805   Team Meeting   Meeting Type Daily Rounds   Team Members Present   Team Members Present Physician; Other (Discipline and Name); Nurse;   Physician Team Member Dr Zuri Martinez / Radha Roper Team Member KIARA [facility] Management Team Member Alecia   Other (Discipline and Name) Sheree Childers - Art Therapy   Patient/Family Present   Patient Present No   Patient's Family Present No

## 2022-08-24 NOTE — PROGRESS NOTES
Progress Note - Behavioral Health     Seb Joseph 24 y o  female MRN: [id number]   Unit/Bed#: Ck Fang 258-02 Encounter: [id number]    Behavior over the last 24 hours: improving  Hailey Arriaga is a 19-year-old female with a history of PTSD and depression who presents for psychiatric follow-up  Staff reports no behavioral issues overnight  She is pleasant, calm and cooperative upon approach  Affect is brighter, speech is more spontaneous and complex and she reports her mood is improved  She feels less depressed, less anxious, more motivated, and more hopeful for the future  She is excited for discharge tomorrow  Denies any SI or HI  No AH or VH  Denies any nightmares or flashbacks as well  Tolerating medications without any issues  Sleep: normal  Appetite: normal  Medication side effects: No   ROS: all other systems are negative    Mental Status Evaluation:    Appearance: casually dressed, appears consistent with stated age, normal grooming  Motor: no psychomotor disturbances, no gait abnormalities  Behavior:  Pleasant, calm, cooperative, interacts with this writer appropriately  Speech: normal rate, rhythm, and volume  Mood: "Better,"less depressed, less anxious  Affect: appropriate, normal range and intensity, mood-congruent, brighter  Thought Process: organized, linear, and goal-oriented; intact associations  Thought Content: denies any delusional material, no preoccupation  Perception: denies any auditory or visual hallucinations, denies other perceptual disturbances  Risk Potential: denies suicidal ideation, plan, or intent   Denies homicidal ideation  Sensorium: Oriented to person, place, time, and situation  Cognition: cognitive ability appears intact but was not quantitatively tested  Consciousness: alert and awake  Attention/Concentration: able to focus without difficulty, attention and concentration are age appropriate  Insight: improved  Judgement: improved    Vital signs in last 24 hours:    Temp:  [98 4 °F (36 9 °C)-98 7 °F (37 1 °C)] 98 4 °F (36 9 °C)  HR:  [] 73  Resp:  [17] 17  BP: (103-139)/(72-94) 103/72    Laboratory results: I have personally reviewed all pertinent laboratory/tests results    Results from the past 24 hours: No results found for this or any previous visit (from the past 24 hour(s))  Most Recent Labs:   Lab Results   Component Value Date    WBC 5 60 08/19/2022    RBC 4 75 08/19/2022    HGB 13 7 08/19/2022    HCT 43 5 08/19/2022     08/19/2022    RDW 14 1 08/19/2022    NEUTROABS 2 07 08/19/2022    SODIUM 141 08/19/2022    K 4 0 08/19/2022     08/19/2022    CO2 27 08/19/2022    BUN 10 08/19/2022    CREATININE 0 87 08/19/2022    GLUC 78 08/19/2022    CALCIUM 9 2 08/19/2022    AST 13 08/19/2022    ALT 17 08/19/2022    ALKPHOS 65 08/19/2022    TP 7 8 08/19/2022    ALB 3 8 08/19/2022    TBILI 0 40 08/19/2022    CHOLESTEROL 125 08/19/2022    HDL 54 08/19/2022    TRIG 41 08/19/2022    LDLCALC 63 08/19/2022    NONHDLC 71 08/19/2022    [id number] 1 363 08/19/2022    FREET4 1 04 04/04/2017    PREGUR negative 08/18/2022    PREGSERUM Negative 08/19/2022    HCGQUANT <2 07/07/2022    RPR Non-Reactive 08/19/2022    HGBA1C 5 2 08/19/2022     08/19/2022       Progress Toward Goals: progressing, working on coping skills, discharge planning    Assessment/Plan   Principal Problem:    Post traumatic stress disorder  Active Problems:    Depression    Medical clearance for psychiatric admission    Severe episode of recurrent major depressive disorder ([facility] )    Anxiety    Acute stress disorder      Recommended Treatment:     Planned medication and treatment changes: All current active medications have been reviewed  Encourage group therapy, milieu therapy and occupational therapy  Behavioral Health checks every 7 minutes    Continue current medications  On track for discharge tomorrow      Current Facility-Administered Medications   Medication Dose Route Frequency Provider Last Rate    acetaminophen  650 mg Oral Q6H PRN Heather Catching, PA-C      acetaminophen  650 mg Oral Q4H PRN Heather Catching, PA-C      acetaminophen  975 mg Oral Q6H PRN Avalon Catching, PA-C      aluminum-magnesium hydroxide-simethicone  30 mL Oral Q4H PRN Heather Catching, PA-C      artificial tear  1 application Both Eyes K3M PRN Avalon Catching, PA-C      bisacodyl  10 mg Rectal Daily PRN Avalon Catching, PA-C      hydrOXYzine HCL  25 mg Oral Q6H PRN Max 4/day Heather Catching, PA-C      hydrOXYzine HCL  50 mg Oral Q4H PRN Max 4/day Avalon Catching, PA-C      Or    LORazepam  1 mg Intramuscular Q4H PRN Avalon Catching, PA-C      LORazepam  1 mg Oral Q4H PRN Max 6/day Avalon Catching, PA-C      Or    LORazepam  2 mg Intramuscular Q6H PRN Max 3/day Heather Catching, PA-C      melatonin  3 mg Oral HS PRN Avalon Catching, PA-C      mirtazapine  15 mg Oral HS Arlester Serafin, DO      nicotine  1 patch Transdermal Daily Heather Catching, PA-C      nicotine polacrilex  4 mg Oral Q2H PRN Heather Catching, PA-C      OLANZapine  5 mg Oral Q4H PRN Max 3/day Heather Catching, PA-C      Or    OLANZapine  2 5 mg Intramuscular Q4H PRN Max 3/day Avalon Catching, PA-C      OLANZapine  5 mg Oral Q3H PRN Max 3/day Heather Catching, PA-C      Or    OLANZapine  5 mg Intramuscular Q3H PRN Max 3/day Avalon Catching, PA-C      OLANZapine  2 5 mg Oral Q4H PRN Max 6/day Heather Catching, PA-C      polyethylene glycol  17 g Oral Daily PRN Avalon Catching, PA-C      prazosin  1 mg Oral HS Arlester Serafin, DO      senna-docusate sodium  1 tablet Oral Daily PRN Avalon Catching, PA-C      sertraline  50 mg Oral Daily Avalon Catching, PA-C       Risks / Benefits of Treatment:    Risks, benefits, and possible side effects of medications explained to patient and patient verbalizes understanding and agreement for treatment  Counseling / Coordination of Care:    Patient's progress discussed with staff in treatment team meeting    Medications, treatment progress and treatment plan reviewed with patient      Vane Villela PA-C 08/24/22

## 2022-08-25 VITALS
RESPIRATION RATE: 18 BRPM | OXYGEN SATURATION: 100 % | WEIGHT: 153 LBS | TEMPERATURE: 98.4 F | HEIGHT: 68 IN | DIASTOLIC BLOOD PRESSURE: 77 MMHG | HEART RATE: 87 BPM | SYSTOLIC BLOOD PRESSURE: 118 MMHG | BODY MASS INDEX: 23.19 KG/M2

## 2022-08-25 PROCEDURE — 99239 HOSP IP/OBS DSCHRG MGMT >30: CPT | Performed by: STUDENT IN AN ORGANIZED HEALTH CARE EDUCATION/TRAINING PROGRAM

## 2022-08-25 RX ORDER — MIRTAZAPINE 15 MG/1
15 TABLET, FILM COATED ORAL
Qty: 30 TABLET | Refills: 1 | Status: SHIPPED | OUTPATIENT
Start: 2022-08-25 | End: 2022-10-24

## 2022-08-25 RX ORDER — HYDROXYZINE HYDROCHLORIDE 25 MG/1
25 TABLET, FILM COATED ORAL EVERY 6 HOURS PRN
Qty: 30 TABLET | Refills: 0 | Status: SHIPPED | OUTPATIENT
Start: 2022-08-25

## 2022-08-25 RX ORDER — PRAZOSIN HYDROCHLORIDE 1 MG/1
1 CAPSULE ORAL
Qty: 30 CAPSULE | Refills: 1 | Status: SHIPPED | OUTPATIENT
Start: 2022-08-25 | End: 2022-10-24

## 2022-08-25 RX ADMIN — SERTRALINE HYDROCHLORIDE 50 MG: 50 TABLET, FILM COATED ORAL at 09:21

## 2022-08-25 NOTE — CASE MANAGEMENT
CM called [facility] ([phone]) CM left voicemail asking for a call back to see if they can schedule pt for intake  CM called [facility] ([phone]) and left voicemail asking for a call back to schedule  CM was informed that [facility] is not taking referrals at this time  CM called [facility] ([phone]) to see if they can schedule pt for an intake for therapy and medication management  CM was transferred to intake department  CM left voicemail asking for a call back to schedule  10:45am   [facility] and she reported that they have a few appointments available but not many, only with a few therapists that have clients closing so they have availability to take on 1 client  She was able to schedule pt for PHONE intake on Wednesday Septemeber 14, 2022 at 11am with Therapist Yennifer Davey

## 2022-08-25 NOTE — PLAN OF CARE
Problem: SELF HARM/SUICIDALITY  Goal: Will have no self-injury during hospital stay  Description: INTERVENTIONS:  - Q 15 MINUTES: Routine safety checks  - Q WAKING SHIFT & PRN: Assess risk to determine if routine checks are adequate to maintain patient safety  - Encourage patient to participate actively in care by formulating a plan to combat response to suicidal ideation, identify supports and resources  Outcome: Adequate for Discharge     Problem: DEPRESSION  Goal: Will be euthymic at discharge  Description: INTERVENTIONS:  - Administer medication as ordered  - Provide emotional support via 1:1 interaction with staff  - Encourage involvement in milieu/groups/activities  - Monitor for social isolation  Outcome: Adequate for Discharge     Problem: ANXIETY  Goal: Will report anxiety at manageable levels  Description: INTERVENTIONS:  - Administer medication as ordered  - Teach and encourage coping skills  - Provide emotional support  - Assess patient/family for anxiety and ability to cope  Outcome: Adequate for Discharge  Goal: By discharge: Patient will verbalize 2 strategies to deal with anxiety  Description: Interventions:  - Identify any obvious source/trigger to anxiety  - Staff will assist patient in applying identified coping technique/skills  - Encourage attendance of scheduled groups and activities  Outcome: Adequate for Discharge     Problem: Nutrition/Hydration-ADULT  Goal: Nutrient/Hydration intake appropriate for improving, restoring or maintaining nutritional needs  Description: Monitor and assess patient's nutrition/hydration status for malnutrition  Collaborate with interdisciplinary team and initiate plan and interventions as ordered  Monitor patient's weight and dietary intake as ordered or per policy  Utilize nutrition screening tool and intervene as necessary  Determine patient's food preferences and provide high-protein, high-caloric foods as appropriate       INTERVENTIONS:  - Monitor oral intake, urinary output, labs, and treatment plans  - Assess nutrition and hydration status and recommend course of action  - Evaluate amount of meals eaten  - Assist patient with eating if necessary   - Allow adequate time for meals  - Recommend/ encourage appropriate diets, oral nutritional supplements, and vitamin/mineral supplements  - Order, calculate, and assess calorie counts as needed  - Recommend, monitor, and adjust tube feedings and TPN/PPN based on assessed needs  - Assess need for intravenous fluids  - Provide specific nutrition/hydration education as appropriate  - Include patient/family/caregiver in decisions related to nutrition  Outcome: Adequate for Discharge     Problem: DISCHARGE PLANNING  Goal: Discharge to home or other facility with appropriate resources  Description: INTERVENTIONS:  - Identify barriers to discharge w/patient and caregiver  - Arrange for needed discharge resources and transportation as appropriate  - Identify discharge learning needs (meds, wound care, etc )  - Arrange for interpretive services to assist at discharge as needed  - Refer to Case Management Department for coordinating discharge planning if the patient needs post-hospital services based on physician/advanced practitioner order or complex needs related to functional status, cognitive ability, or social support system  Outcome: Adequate for Discharge     Expressed readiness for discharge
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
## 2022-08-25 NOTE — NURSING NOTE
Awake and alert  Present in milieu, social with peers and attending groups  Expressed readiness for discharge  Denies SI/HI and hallucinations  Improved mood  Improved sleep and appetite  States mom is a good support for her  CM provided patient with information on [facility]  Encouraged compliance with medications and OP appointments  Mom will provided discharge transportation  No questions or concerns
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
## 2024-08-13 ENCOUNTER — HOSPITAL ENCOUNTER (EMERGENCY)
Facility: HOSPITAL | Age: 24
Discharge: HOME | End: 2024-08-13
Attending: EMERGENCY MEDICINE
Payer: COMMERCIAL

## 2024-08-13 VITALS
WEIGHT: 141.54 LBS | BODY MASS INDEX: 21.52 KG/M2 | DIASTOLIC BLOOD PRESSURE: 82 MMHG | HEART RATE: 80 BPM | RESPIRATION RATE: 16 BRPM | OXYGEN SATURATION: 100 % | SYSTOLIC BLOOD PRESSURE: 128 MMHG | TEMPERATURE: 97.9 F

## 2024-08-13 DIAGNOSIS — Z59.00 HOMELESSNESS: ICD-10-CM

## 2024-08-13 DIAGNOSIS — F19.10 SUBSTANCE ABUSE (HCC): ICD-10-CM

## 2024-08-13 DIAGNOSIS — F11.10 OPIOID ABUSE (HCC): Primary | ICD-10-CM

## 2024-08-13 PROCEDURE — 99284 EMERGENCY DEPT VISIT MOD MDM: CPT | Performed by: EMERGENCY MEDICINE

## 2024-08-13 PROCEDURE — 99284 EMERGENCY DEPT VISIT MOD MDM: CPT

## 2024-08-13 RX ADMIN — NALOXONE HYDROCHLORIDE 4 MG: 4 SPRAY NASAL at 06:28

## 2024-08-13 SDOH — ECONOMIC STABILITY - HOUSING INSECURITY: HOMELESSNESS UNSPECIFIED: Z59.00

## 2024-08-13 NOTE — ED CARE HANDOFF
[facility] Warm Handoff Outcome Note    Patient name Mag Hinojosa  Location FT 01/FT 01 MRN [id number]  Age: 23 y.o.          Plan Type:  Warm Handoff                                                                                    Plan Date: 8/13/2024  Service:  ED Warm Handoff      Substance Use History:  Opiates    Warm Handoff Update:  Pt d/c home    Warm Handoff Outcome: Treatment Related Resources

## 2024-08-13 NOTE — ED NOTES
Assumed care for pt. Pt currently sleeping with no s/s of distress observed.     Giana Anne RN  08/13/24 5404

## 2024-08-13 NOTE — CERTIFIED RECOVERY SPECIALIST
"   Certified  Note    Patient name: Mag Hinojosa  Location:   [place]  Attending:  Odell Costello MD MRN [id number]  : 2000  Age: 23 y.o.    Sex: female Date 2024         Substance Use History:     Social History     Substance and Sexual Activity   Alcohol Use Not Currently    Alcohol/week: 1.0 - 5.0 standard drink of alcohol    Types: 1 - 5 Glasses of wine per week    Comment: pt denies in last month, but has had > 5 drinks in one sitting.        Social History     Substance and Sexual Activity   Drug Use Yes    Types: Heroin, Fentanyl    Comment: reports \"weaning herself off.\"     Time spent 15 min  Encounter type: Initial ED   Consult rcvd: Y  Patient seen in: ED  Stage of change:denial     Substance used: fentanyl   Frequency: unknown   Last used: 2024    History of withdrawal seizure: No  Other Medical condition: NA   Currently on MOUD: No    CRS met with patient in ED. CRS provided introductions and explanation of service. CRS and patient engaged in conversation of hospitalization. Patient reports that she is currently homeless and can not go home because her family member is in recovery.     Patient declined services at this time, resources provided.     RENITA Plaza        "
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
